# Patient Record
Sex: FEMALE | Race: WHITE | Employment: FULL TIME | URBAN - METROPOLITAN AREA
[De-identification: names, ages, dates, MRNs, and addresses within clinical notes are randomized per-mention and may not be internally consistent; named-entity substitution may affect disease eponyms.]

---

## 2017-08-16 ENCOUNTER — OFFICE VISIT (OUTPATIENT)
Dept: CARDIOLOGY CLINIC | Age: 57
End: 2017-08-16

## 2017-08-16 VITALS
SYSTOLIC BLOOD PRESSURE: 166 MMHG | OXYGEN SATURATION: 98 % | RESPIRATION RATE: 18 BRPM | HEART RATE: 76 BPM | DIASTOLIC BLOOD PRESSURE: 88 MMHG | HEIGHT: 66 IN | WEIGHT: 119 LBS | BODY MASS INDEX: 19.13 KG/M2

## 2017-08-16 DIAGNOSIS — F17.200 TOBACCO USE DISORDER: ICD-10-CM

## 2017-08-16 DIAGNOSIS — I10 ESSENTIAL HYPERTENSION, BENIGN: Primary | ICD-10-CM

## 2017-08-16 NOTE — PROGRESS NOTES
[address] [facility]        [phone]                             NEW PATIENT HPI/FOLLOW-UP    NAME:  Sadi Cortez   :   1960   MRN:   [id number]   PCP:  Cortez Shah MD           Subjective: The patient is a 56y.o. year old female who returns for a routine follow-up. Since the last visit, patient reports no new symptoms. Under stress and duress at work. Brings BP diary which reveals normal values < 130/85 mmHg. Denies change in exercise tolerance, chest pain, edema, medication intolerance, palpitations, shortness of breath, PND/orthopnea wheezing, sputum, syncope, dizziness or light headedness. Doing satisfactorily. Review of Systems  General: Pt denies excessive weight gain or loss. Pt is able to conduct ADL's. Respiratory: Denies shortness of breath, BENITEZ, wheezing or stridor. Cardiovascular: Denies precordial pain, palpitations, edema or PND  Gastrointestinal: Denies poor appetite, indigestion, abdominal pain or blood in stool  Peripheral vascular: Denies claudication, leg cramps  Neurological: Denies paresthesias, tingling.numbness  Psychiatric: Denies anxiety,depression,fatigue  Musculoskeletal: Denies pain,tenderness, soreness,swelling      Past Medical History:   Diagnosis Date    Dizziness and giddiness     GERD (gastroesophageal reflux disease)     Tobacco use disorder      Patient Active Problem List    Diagnosis Date Noted    Tobacco use disorder 04/10/2012    Essential hypertension, benign 2012    Dizziness and giddiness 2012      No past surgical history on file. No Known Allergies   No family history on file. Social History     Social History    Marital status:      Spouse name: N/A    Number of children: N/A    Years of education: N/A     Occupational History    Not on file.      Social History Main Topics    Smoking status: Current Every Day Smoker     Packs/day: 1.50     Years: 20.00     Types: Cigarettes    Smokeless tobacco: Never Used    Alcohol use No    Drug use: Not on file    Sexual activity: Not on file     Other Topics Concern    Not on file     Social History Narrative      Current Outpatient Prescriptions   Medication Sig    carvedilol (COREG) 3.125 mg tablet Take one tablet or 3.125 mg by mouth twice a day with meals    losartan-hydroCHLOROthiazide (HYZAAR) 100-12.5 mg per tablet take 1 tablet by mouth once daily    UBIDECARENONE (COENZYME Q10) 100 mg Tab Take 200 mg by mouth.  ibuprofen (ADVIL) 200 mg tablet Take  by mouth every six (6) hours as needed.  MULTIVIT WITH CALCIUM,IRON,MIN (ONE-A-DAY WOMENS FORMULA PO) Take  by mouth. No current facility-administered medications for this visit. I have reviewed the nurses notes, vitals, problem list, allergy list, medical history, family medical, social history and medications. Objective:     Physical Exam:     Vitals:    08/16/17 1451   BP: 166/88   Pulse: 76   Resp: 18   SpO2: 98%   Weight: 119 lb (54 kg)   Height: 5' 6\" (1.676 m)    Body mass index is 19.21 kg/(m^2). General: Well developed, in no acute distress. HEENT: No carotid bruits, no JVD, trach is midline. Heart:  Normal S1/S2 negative S3 or S4. Regular, no murmur, gallop or rub.   Respiratory: Clear bilaterally, no wheezing or rales  Abdomen:   Soft, non-tender, bowel sounds are active.   Extremities:  No edema, normal cap refill, no cyanosis. Neuro: A&Ox3, speech clear, gait stable. Skin: Skin color is normal. No rashes or lesions. No diaphoresis. Vascular: 2+ pulses symmetric in all extremities        Data Review:       Cardiographics:    EKG: Probable sinus rhythm, poor anteroseptal R wave progression. Cardiology Labs:    No results found for this or any previous visit.     Lab Results   Component Value Date/Time    Cholesterol, total 155 07/24/2013 12:00 AM    HDL Cholesterol 60 07/24/2013 12:00 AM    LDL, calculated 75 07/24/2013 12:00 AM    Triglyceride 98 07/24/2013 12:00 AM       Lab Results   Component Value Date/Time    Sodium 139 07/24/2013 12:00 AM    Potassium 4.1 07/24/2013 12:00 AM    Chloride 103 07/24/2013 12:00 AM    CO2 23 07/24/2013 12:00 AM    Glucose 90 07/24/2013 12:00 AM    BUN 14 07/24/2013 12:00 AM    Creatinine 0.73 07/24/2013 12:00 AM    BUN/Creatinine ratio 19 07/24/2013 12:00 AM    GFR est non-AA 96 07/24/2013 12:00 AM    Calcium 9.2 07/24/2013 12:00 AM    Bilirubin, total 0.5 07/24/2013 12:00 AM    AST (SGOT) 14 07/24/2013 12:00 AM    Alk. phosphatase 80 07/24/2013 12:00 AM    Protein, total 7.1 07/24/2013 12:00 AM    Albumin 4.2 07/24/2013 12:00 AM    A-G Ratio 1.4 07/24/2013 12:00 AM    ALT (SGPT) 6 07/24/2013 12:00 AM          Assessment:       ICD-10-CM ICD-9-CM    1. Essential hypertension, benign I10 401.1 AMB POC EKG ROUTINE W/ 12 LEADS, INTER & REP   2. Tobacco use disorder F17.200 305.1          Discussion: Patient presents at this time stable from a cardiac perspective. BP at goal. Pleased with present status. Plan: 1. Continue same meds. Lipid profile and labs followed by PCP. 2.Encouraged to exercise to tolerance and follow low fat, low cholesterol, low sodium predominantly Plant-based (consider Mediterranean) diet. Call with questions or concerns. Will follow up any test results by phone and/or f/u here in office if needed. Nathanael Carreon 3.Follow up: 1 YEAR    I have discussed the diagnosis with the patient and the intended plan as seen in the above orders. The patient has received an after-visit summary and questions were answered concerning future plans. I have discussed any concerning medication side effects and warnings with the patient as well.     Jackelyn Jaeger MD  8/16/2017

## 2017-08-21 DIAGNOSIS — I10 ESSENTIAL HYPERTENSION, BENIGN: ICD-10-CM

## 2017-08-21 RX ORDER — CARVEDILOL 3.12 MG/1
TABLET ORAL
Qty: 180 TAB | Refills: 3 | Status: SHIPPED | OUTPATIENT
Start: 2017-08-21 | End: 2018-01-01 | Stop reason: SDUPTHER

## 2017-08-21 RX ORDER — LOSARTAN POTASSIUM AND HYDROCHLOROTHIAZIDE 12.5; 1 MG/1; MG/1
TABLET ORAL
Qty: 90 TAB | Refills: 3 | Status: SHIPPED | OUTPATIENT
Start: 2017-08-21 | End: 2018-01-01 | Stop reason: SDUPTHER

## 2018-01-01 ENCOUNTER — OFFICE VISIT (OUTPATIENT)
Dept: CARDIOLOGY CLINIC | Age: 58
End: 2018-01-01

## 2018-01-01 VITALS
HEIGHT: 66 IN | HEART RATE: 80 BPM | DIASTOLIC BLOOD PRESSURE: 84 MMHG | RESPIRATION RATE: 16 BRPM | WEIGHT: 115.9 LBS | SYSTOLIC BLOOD PRESSURE: 158 MMHG | OXYGEN SATURATION: 98 % | BODY MASS INDEX: 18.63 KG/M2

## 2018-01-01 DIAGNOSIS — I10 ESSENTIAL HYPERTENSION, BENIGN: Primary | ICD-10-CM

## 2018-01-01 DIAGNOSIS — F17.200 TOBACCO USE DISORDER: ICD-10-CM

## 2018-01-01 DIAGNOSIS — F41.1 GENERALIZED ANXIETY DISORDER: ICD-10-CM

## 2018-01-01 DIAGNOSIS — I10 ESSENTIAL HYPERTENSION, BENIGN: ICD-10-CM

## 2018-01-01 RX ORDER — LOSARTAN POTASSIUM AND HYDROCHLOROTHIAZIDE 12.5; 1 MG/1; MG/1
TABLET ORAL
Qty: 90 TAB | Refills: 3 | Status: SHIPPED | OUTPATIENT
Start: 2018-01-01

## 2018-01-01 RX ORDER — CARVEDILOL 3.12 MG/1
TABLET ORAL
Qty: 180 TAB | Refills: 3 | Status: SHIPPED | OUTPATIENT
Start: 2018-01-01

## 2018-08-09 PROBLEM — F41.1 GENERALIZED ANXIETY DISORDER: Status: ACTIVE | Noted: 2018-01-01

## 2018-08-09 NOTE — PROGRESS NOTES
[address] [place] [address]        [phone]                             NEW PATIENT HPI/FOLLOW-UP    NAME:  Oscar Garrett   :   1960   MRN:   [id number]   PCP:  Apolinar Garcia MD           Subjective: The patient is a 66y.o. year old female  who returns for a routine follow-up. Since the last visit, patient reports no new symptoms. C/O family and job stress. Denies change in exercise tolerance, chest pain, edema, medication intolerance, palpitations, shortness of breath, PND/orthopnea wheezing, sputum, syncope, dizziness or light headedness. Doing satisfactorily. Review of Systems  General: Pt denies excessive weight gain or loss. Pt is able to conduct ADL's. Respiratory: Denies shortness of breath, BENITEZ, wheezing or stridor. Cardiovascular: Denies precordial pain, palpitations, edema or PND  Gastrointestinal: Denies poor appetite, indigestion, abdominal pain or blood in stool  Peripheral vascular: Denies claudication, leg cramps  Neurological: Denies paresthesias, tingling.numbness  Psychiatric: +anxiety,depression,-fatigue  Musculoskeletal: Denies pain,tenderness, soreness,swelling      Past Medical History:   Diagnosis Date    Dizziness and giddiness     GERD (gastroesophageal reflux disease)     Tobacco use disorder      Patient Active Problem List    Diagnosis Date Noted    Tobacco use disorder 04/10/2012    Essential hypertension, benign 2012    Dizziness and giddiness 2012      No past surgical history on file. No Known Allergies   No family history on file. Social History     Social History    Marital status:      Spouse name: N/A    Number of children: N/A    Years of education: N/A     Occupational History    Not on file.      Social History Main Topics    Smoking status: Current Some Day Smoker     Packs/day: 1.50     Years: 20.00     Types: Cigarettes    Smokeless tobacco: Never Used      Comment: 2-3 CIAGARETTES DAILY OR SOMETIMES    Alcohol use No    Drug use: No    Sexual activity: Not on file     Other Topics Concern    Not on file     Social History Narrative      Current Outpatient Prescriptions   Medication Sig    carvedilol (COREG) 3.125 mg tablet Take one tablet or 3.125 mg by mouth twice a day with meals    losartan-hydroCHLOROthiazide (HYZAAR) 100-12.5 mg per tablet take 1 tablet by mouth once daily    UBIDECARENONE (COENZYME Q10) 100 mg Tab Take 200 mg by mouth.  ibuprofen (ADVIL) 200 mg tablet Take  by mouth every six (6) hours as needed.  MULTIVIT WITH CALCIUM,IRON,MIN (ONE-A-DAY WOMENS FORMULA PO) Take  by mouth. No current facility-administered medications for this visit. I have reviewed the nurses notes, vitals, problem list, allergy list, medical history, family medical, social history and medications. Objective:     Physical Exam:     Vitals:    08/08/18 1607   BP: 158/84   Pulse: 80   Resp: 16   SpO2: 98%   Weight: 115 lb 14.4 oz (52.6 kg)   Height: 5' 6\" (1.676 m)    Body mass index is 18.71 kg/(m^2). General: Well developed, thin, in no acute distress. HEENT: No carotid bruits, no JVD, trach is midline. Heart:  Normal S1/S2 negative S3 or S4. Regular, no murmur, gallop or rub.   Respiratory: Clear bilaterally, no wheezing or rales  Abdomen:   Soft, non-tender, bowel sounds are active.   Extremities:  No edema, normal cap refill, no cyanosis. Neuro: A&Ox3, speech clear, gait stable. Skin: Skin color is normal. No rashes or lesions. No diaphoresis. Vascular: 2+ pulses symmetric in all extremities        Data Review:       Cardiographics:    EKG: Probably sinus though ectopic atrial rhythm not excluded    Cardiology Labs:    No results found for this or any previous visit.     Lab Results   Component Value Date/Time    Cholesterol, total 155 07/24/2013 12:00 AM    HDL Cholesterol 60 07/24/2013 12:00 AM    LDL, calculated 75 07/24/2013 12:00 AM    Triglyceride 98 07/24/2013 12:00 AM       Lab Results   Component Value Date/Time    Sodium 139 07/24/2013 12:00 AM    Potassium 4.1 07/24/2013 12:00 AM    Chloride 103 07/24/2013 12:00 AM    CO2 23 07/24/2013 12:00 AM    Glucose 90 07/24/2013 12:00 AM    BUN 14 07/24/2013 12:00 AM    Creatinine 0.73 07/24/2013 12:00 AM    BUN/Creatinine ratio 19 07/24/2013 12:00 AM    GFR est non-AA 96 07/24/2013 12:00 AM    Calcium 9.2 07/24/2013 12:00 AM    Bilirubin, total 0.5 07/24/2013 12:00 AM    AST (SGOT) 14 07/24/2013 12:00 AM    Alk. phosphatase 80 07/24/2013 12:00 AM    Protein, total 7.1 07/24/2013 12:00 AM    Albumin 4.2 07/24/2013 12:00 AM    A-G Ratio 1.4 07/24/2013 12:00 AM    ALT (SGPT) 6 07/24/2013 12:00 AM          Assessment:       ICD-10-CM ICD-9-CM    1. Essential hypertension, benign I10 401.1 AMB POC EKG ROUTINE W/ 12 LEADS, INTER & REP   2. Tobacco use disorder F17.200 305.1    3. Generalized anxiety disorder F41.1 300.02          Discussion: Patient presents at this time stable from a cardiac perspective. BP at goal. Continue meds. C/O anxiety family and job generated. Discussing with PCP. Pleased with present cardiac status. Plan: 1. Continue same meds. Lipid profile and labs followed by PCP--at goal in past.    2.Encouraged to exercise to tolerance, stop smoking and follow low fat, low cholesterol, low sodium predominantly Plant-based (consider Mediterranean) diet. Call with questions or concerns. Will follow up any test results by phone and/or f/u here in office if needed. Nile Kidd 3.Follow up: 6 months    I have discussed the diagnosis with the patient and the intended plan as seen in the above orders. The patient has received an after-visit summary and questions were answered concerning future plans. I have discussed any concerning medication side effects and warnings with the patient as well.     Blair Spencer MD  8/9/2018

## 2018-08-20 NOTE — TELEPHONE ENCOUNTER
Patient is in need of refills, didn't have meds on her so she didn't know the exact ones. She said pharmacy faxed over refill request. Thanks

## 2019-01-01 ENCOUNTER — HOSPITAL ENCOUNTER (OUTPATIENT)
Dept: CT IMAGING | Age: 59
Discharge: HOME | End: 2019-01-08
Attending: PHYSICIAN ASSISTANT
Payer: COMMERCIAL

## 2019-01-01 ENCOUNTER — HOSPICE ADMISSION (OUTPATIENT)
Dept: HOSPICE | Facility: HOSPICE | Age: 59
End: 2019-01-01
Payer: COMMERCIAL

## 2019-01-01 ENCOUNTER — HOME CARE VISIT (OUTPATIENT)
Dept: SCHEDULING | Facility: HOME HEALTH | Age: 59
End: 2019-01-01
Payer: COMMERCIAL

## 2019-01-01 ENCOUNTER — APPOINTMENT (OUTPATIENT)
Dept: GENERAL RADIOLOGY | Age: 59
DRG: 189 | End: 2019-01-01
Attending: EMERGENCY MEDICINE
Payer: COMMERCIAL

## 2019-01-01 ENCOUNTER — APPOINTMENT (OUTPATIENT)
Dept: ULTRASOUND IMAGING | Age: 59
DRG: 189 | End: 2019-01-01
Attending: INTERNAL MEDICINE
Payer: COMMERCIAL

## 2019-01-01 ENCOUNTER — HOSPITAL ENCOUNTER (INPATIENT)
Age: 59
LOS: 1 days | DRG: 189 | End: 2019-01-20
Attending: INTERNAL MEDICINE | Admitting: INTERNAL MEDICINE
Payer: OTHER MISCELLANEOUS

## 2019-01-01 ENCOUNTER — APPOINTMENT (OUTPATIENT)
Dept: CT IMAGING | Age: 59
DRG: 189 | End: 2019-01-01
Attending: EMERGENCY MEDICINE
Payer: COMMERCIAL

## 2019-01-01 ENCOUNTER — HOSPITAL ENCOUNTER (INPATIENT)
Age: 59
LOS: 4 days | Discharge: HOSPICE | DRG: 189 | End: 2019-01-19
Attending: EMERGENCY MEDICINE | Admitting: INTERNAL MEDICINE
Payer: COMMERCIAL

## 2019-01-01 VITALS
TEMPERATURE: 97.1 F | BODY MASS INDEX: 16.55 KG/M2 | SYSTOLIC BLOOD PRESSURE: 87 MMHG | OXYGEN SATURATION: 100 % | DIASTOLIC BLOOD PRESSURE: 63 MMHG | HEIGHT: 66 IN | WEIGHT: 103 LBS | RESPIRATION RATE: 15 BRPM | HEART RATE: 103 BPM

## 2019-01-01 VITALS
HEART RATE: 111 BPM | TEMPERATURE: 99.7 F | OXYGEN SATURATION: 97 % | DIASTOLIC BLOOD PRESSURE: 53 MMHG | SYSTOLIC BLOOD PRESSURE: 92 MMHG | RESPIRATION RATE: 20 BRPM

## 2019-01-01 DIAGNOSIS — Z51.5 ENCOUNTER FOR DYING CARE: ICD-10-CM

## 2019-01-01 DIAGNOSIS — Z63.8 CAREGIVER ROLE STRAIN: ICD-10-CM

## 2019-01-01 DIAGNOSIS — Z71.89 GOALS OF CARE, COUNSELING/DISCUSSION: ICD-10-CM

## 2019-01-01 DIAGNOSIS — R06.03 ACUTE RESPIRATORY DISTRESS: ICD-10-CM

## 2019-01-01 DIAGNOSIS — Z71.89 COUNSELING REGARDING ADVANCED CARE PLANNING AND GOALS OF CARE: ICD-10-CM

## 2019-01-01 DIAGNOSIS — R00.0 SINUS TACHYCARDIA: ICD-10-CM

## 2019-01-01 DIAGNOSIS — J44.9 COPD, SEVERE (HCC): Primary | ICD-10-CM

## 2019-01-01 DIAGNOSIS — R77.8 ELEVATED TROPONIN: ICD-10-CM

## 2019-01-01 DIAGNOSIS — R64 CACHECTIC (HCC): ICD-10-CM

## 2019-01-01 DIAGNOSIS — F41.9 ANXIETY: ICD-10-CM

## 2019-01-01 DIAGNOSIS — R59.0 MEDIASTINAL LYMPHADENOPATHY: ICD-10-CM

## 2019-01-01 DIAGNOSIS — R05.9 COUGH: ICD-10-CM

## 2019-01-01 DIAGNOSIS — R59.9 ENLARGED LYMPH NODES: ICD-10-CM

## 2019-01-01 DIAGNOSIS — R06.02 SHORTNESS OF BREATH: ICD-10-CM

## 2019-01-01 LAB
ALBUMIN SERPL-MCNC: 2.9 G/DL (ref 3.5–5)
ALBUMIN SERPL-MCNC: 2.9 G/DL (ref 3.5–5)
ALBUMIN/GLOB SERPL: 0.6 {RATIO} (ref 1.1–2.2)
ALBUMIN/GLOB SERPL: 0.6 {RATIO} (ref 1.1–2.2)
ALP SERPL-CCNC: 151 U/L (ref 45–117)
ALP SERPL-CCNC: 159 U/L (ref 45–117)
ALT SERPL-CCNC: 57 U/L (ref 12–78)
ALT SERPL-CCNC: 58 U/L (ref 12–78)
ANION GAP SERPL CALC-SCNC: 10 MMOL/L (ref 5–15)
ANION GAP SERPL CALC-SCNC: 10 MMOL/L (ref 5–15)
ANION GAP SERPL CALC-SCNC: 13 MMOL/L (ref 5–15)
APTT PPP: 26.1 SEC (ref 22.1–32)
APTT PPP: 34.4 SEC (ref 22.1–32)
APTT PPP: 40.6 SEC (ref 22.1–32)
APTT PPP: 47.6 SEC (ref 22.1–32)
APTT PPP: 50.6 SEC (ref 22.1–32)
APTT PPP: 68.6 SEC (ref 22.1–32)
APTT PPP: 76.8 SEC (ref 22.1–32)
AST SERPL-CCNC: 107 U/L (ref 15–37)
AST SERPL-CCNC: 130 U/L (ref 15–37)
ATRIAL RATE: 118 BPM
ATRIAL RATE: 135 BPM
BASOPHILS # BLD: 0 K/UL (ref 0–0.1)
BASOPHILS NFR BLD: 0 % (ref 0–1)
BILIRUB DIRECT SERPL-MCNC: <0.1 MG/DL (ref 0–0.2)
BILIRUB SERPL-MCNC: 0.2 MG/DL (ref 0.2–1)
BILIRUB SERPL-MCNC: 0.3 MG/DL (ref 0.2–1)
BNP SERPL-MCNC: 797 PG/ML (ref 0–125)
BUN SERPL-MCNC: 28 MG/DL (ref 6–20)
BUN SERPL-MCNC: 29 MG/DL (ref 6–20)
BUN SERPL-MCNC: 42 MG/DL (ref 6–20)
BUN/CREAT SERPL: 32 (ref 12–20)
BUN/CREAT SERPL: 32 (ref 12–20)
BUN/CREAT SERPL: 44 (ref 12–20)
CALCIUM SERPL-MCNC: 8.4 MG/DL (ref 8.5–10.1)
CALCIUM SERPL-MCNC: 8.7 MG/DL (ref 8.5–10.1)
CALCIUM SERPL-MCNC: 8.7 MG/DL (ref 8.5–10.1)
CALCULATED P AXIS, ECG09: 89 DEGREES
CALCULATED P AXIS, ECG09: 90 DEGREES
CALCULATED R AXIS, ECG10: -43 DEGREES
CALCULATED R AXIS, ECG10: 18 DEGREES
CALCULATED T AXIS, ECG11: 100 DEGREES
CALCULATED T AXIS, ECG11: 90 DEGREES
CHLORIDE SERPL-SCNC: 104 MMOL/L (ref 97–108)
CHLORIDE SERPL-SCNC: 107 MMOL/L (ref 97–108)
CHLORIDE SERPL-SCNC: 108 MMOL/L (ref 97–108)
CHOLEST SERPL-MCNC: 132 MG/DL
CK SERPL-CCNC: 133 U/L (ref 26–192)
CO2 SERPL-SCNC: 18 MMOL/L (ref 21–32)
CO2 SERPL-SCNC: 22 MMOL/L (ref 21–32)
CO2 SERPL-SCNC: 22 MMOL/L (ref 21–32)
CREAT SERPL-MCNC: 0.88 MG/DL (ref 0.55–1.02)
CREAT SERPL-MCNC: 0.9 MG/DL (ref 0.55–1.02)
CREAT SERPL-MCNC: 0.95 MG/DL (ref 0.55–1.02)
DIAGNOSIS, 93000: NORMAL
DIAGNOSIS, 93000: NORMAL
DIFFERENTIAL METHOD BLD: ABNORMAL
EOSINOPHIL # BLD: 0 K/UL (ref 0–0.4)
EOSINOPHIL NFR BLD: 0 % (ref 0–7)
ERYTHROCYTE [DISTWIDTH] IN BLOOD BY AUTOMATED COUNT: 13.4 % (ref 11.5–14.5)
ERYTHROCYTE [DISTWIDTH] IN BLOOD BY AUTOMATED COUNT: 13.5 % (ref 11.5–14.5)
ERYTHROCYTE [DISTWIDTH] IN BLOOD BY AUTOMATED COUNT: 13.6 % (ref 11.5–14.5)
ERYTHROCYTE [DISTWIDTH] IN BLOOD BY AUTOMATED COUNT: 13.6 % (ref 11.5–14.5)
ERYTHROCYTE [DISTWIDTH] IN BLOOD BY AUTOMATED COUNT: 13.8 % (ref 11.5–14.5)
EST. AVERAGE GLUCOSE BLD GHB EST-MCNC: 123 MG/DL
GLOBULIN SER CALC-MCNC: 4.5 G/DL (ref 2–4)
GLOBULIN SER CALC-MCNC: 4.7 G/DL (ref 2–4)
GLUCOSE SERPL-MCNC: 135 MG/DL (ref 65–100)
GLUCOSE SERPL-MCNC: 144 MG/DL (ref 65–100)
GLUCOSE SERPL-MCNC: 168 MG/DL (ref 65–100)
HBA1C MFR BLD: 5.9 % (ref 4.2–6.3)
HCT VFR BLD AUTO: 33.3 % (ref 35–47)
HCT VFR BLD AUTO: 33.8 % (ref 35–47)
HCT VFR BLD AUTO: 34.4 % (ref 35–47)
HCT VFR BLD AUTO: 34.8 % (ref 35–47)
HCT VFR BLD AUTO: 34.8 % (ref 35–47)
HDLC SERPL-MCNC: 63 MG/DL
HDLC SERPL: 2.1 {RATIO} (ref 0–5)
HGB BLD-MCNC: 10.9 G/DL (ref 11.5–16)
HGB BLD-MCNC: 11.2 G/DL (ref 11.5–16)
HGB BLD-MCNC: 11.2 G/DL (ref 11.5–16)
HGB BLD-MCNC: 11.4 G/DL (ref 11.5–16)
HGB BLD-MCNC: 11.5 G/DL (ref 11.5–16)
IMM GRANULOCYTES # BLD AUTO: 0.1 K/UL (ref 0–0.04)
IMM GRANULOCYTES # BLD AUTO: 0.2 K/UL (ref 0–0.04)
IMM GRANULOCYTES NFR BLD AUTO: 1 % (ref 0–0.5)
INR PPP: 1.1 (ref 0.9–1.1)
LDLC SERPL CALC-MCNC: 47.8 MG/DL (ref 0–100)
LIPID PROFILE,FLP: NORMAL
LYMPHOCYTES # BLD: 0.5 K/UL (ref 0.8–3.5)
LYMPHOCYTES # BLD: 0.7 K/UL (ref 0.8–3.5)
LYMPHOCYTES # BLD: 0.8 K/UL (ref 0.8–3.5)
LYMPHOCYTES # BLD: 1 K/UL (ref 0.8–3.5)
LYMPHOCYTES # BLD: 1.1 K/UL (ref 0.8–3.5)
LYMPHOCYTES NFR BLD: 3 % (ref 12–49)
LYMPHOCYTES NFR BLD: 4 % (ref 12–49)
LYMPHOCYTES NFR BLD: 5 % (ref 12–49)
LYMPHOCYTES NFR BLD: 6 % (ref 12–49)
LYMPHOCYTES NFR BLD: 7 % (ref 12–49)
MAGNESIUM SERPL-MCNC: 2.9 MG/DL (ref 1.6–2.4)
MCH RBC QN AUTO: 28.8 PG (ref 26–34)
MCH RBC QN AUTO: 29.2 PG (ref 26–34)
MCH RBC QN AUTO: 29.3 PG (ref 26–34)
MCH RBC QN AUTO: 29.6 PG (ref 26–34)
MCH RBC QN AUTO: 29.6 PG (ref 26–34)
MCHC RBC AUTO-ENTMCNC: 32.6 G/DL (ref 30–36.5)
MCHC RBC AUTO-ENTMCNC: 32.7 G/DL (ref 30–36.5)
MCHC RBC AUTO-ENTMCNC: 32.8 G/DL (ref 30–36.5)
MCHC RBC AUTO-ENTMCNC: 33 G/DL (ref 30–36.5)
MCHC RBC AUTO-ENTMCNC: 33.1 G/DL (ref 30–36.5)
MCV RBC AUTO: 87.9 FL (ref 80–99)
MCV RBC AUTO: 89.2 FL (ref 80–99)
MCV RBC AUTO: 89.5 FL (ref 80–99)
MCV RBC AUTO: 89.5 FL (ref 80–99)
MCV RBC AUTO: 89.6 FL (ref 80–99)
MONOCYTES # BLD: 0.2 K/UL (ref 0–1)
MONOCYTES # BLD: 0.5 K/UL (ref 0–1)
MONOCYTES # BLD: 0.5 K/UL (ref 0–1)
MONOCYTES # BLD: 1 K/UL (ref 0–1)
MONOCYTES # BLD: 1.3 K/UL (ref 0–1)
MONOCYTES NFR BLD: 1 % (ref 5–13)
MONOCYTES NFR BLD: 4 % (ref 5–13)
MONOCYTES NFR BLD: 4 % (ref 5–13)
MONOCYTES NFR BLD: 6 % (ref 5–13)
MONOCYTES NFR BLD: 6 % (ref 5–13)
NEUTS SEG # BLD: 10.9 K/UL (ref 1.8–8)
NEUTS SEG # BLD: 12.2 K/UL (ref 1.8–8)
NEUTS SEG # BLD: 14.1 K/UL (ref 1.8–8)
NEUTS SEG # BLD: 15.7 K/UL (ref 1.8–8)
NEUTS SEG # BLD: 20.5 K/UL (ref 1.8–8)
NEUTS SEG NFR BLD: 87 % (ref 32–75)
NEUTS SEG NFR BLD: 90 % (ref 32–75)
NEUTS SEG NFR BLD: 95 % (ref 32–75)
NRBC # BLD: 0 K/UL (ref 0–0.01)
NRBC BLD-RTO: 0 PER 100 WBC
P-R INTERVAL, ECG05: 134 MS
P-R INTERVAL, ECG05: 138 MS
PLATELET # BLD AUTO: 309 K/UL (ref 150–400)
PLATELET # BLD AUTO: 357 K/UL (ref 150–400)
PLATELET # BLD AUTO: 411 K/UL (ref 150–400)
PLATELET # BLD AUTO: 450 K/UL (ref 150–400)
PLATELET # BLD AUTO: 499 K/UL (ref 150–400)
PMV BLD AUTO: 10.2 FL (ref 8.9–12.9)
PMV BLD AUTO: 10.2 FL (ref 8.9–12.9)
PMV BLD AUTO: 10.3 FL (ref 8.9–12.9)
PMV BLD AUTO: 10.4 FL (ref 8.9–12.9)
PMV BLD AUTO: 9.9 FL (ref 8.9–12.9)
POTASSIUM SERPL-SCNC: 3.5 MMOL/L (ref 3.5–5.1)
POTASSIUM SERPL-SCNC: 3.9 MMOL/L (ref 3.5–5.1)
POTASSIUM SERPL-SCNC: 4 MMOL/L (ref 3.5–5.1)
PROT SERPL-MCNC: 7.4 G/DL (ref 6.4–8.2)
PROT SERPL-MCNC: 7.6 G/DL (ref 6.4–8.2)
PROTHROMBIN TIME: 11.6 SEC (ref 9–11.1)
Q-T INTERVAL, ECG07: 280 MS
Q-T INTERVAL, ECG07: 346 MS
QRS DURATION, ECG06: 72 MS
QRS DURATION, ECG06: 72 MS
QTC CALCULATION (BEZET), ECG08: 420 MS
QTC CALCULATION (BEZET), ECG08: 484 MS
RBC # BLD AUTO: 3.72 M/UL (ref 3.8–5.2)
RBC # BLD AUTO: 3.79 M/UL (ref 3.8–5.2)
RBC # BLD AUTO: 3.84 M/UL (ref 3.8–5.2)
RBC # BLD AUTO: 3.89 M/UL (ref 3.8–5.2)
RBC # BLD AUTO: 3.96 M/UL (ref 3.8–5.2)
RBC MORPH BLD: ABNORMAL
RBC MORPH BLD: ABNORMAL
SODIUM SERPL-SCNC: 136 MMOL/L (ref 136–145)
SODIUM SERPL-SCNC: 138 MMOL/L (ref 136–145)
SODIUM SERPL-SCNC: 140 MMOL/L (ref 136–145)
T4 FREE SERPL-MCNC: 1 NG/DL (ref 0.8–1.5)
THERAPEUTIC RANGE,PTTT: ABNORMAL SECS (ref 58–77)
THERAPEUTIC RANGE,PTTT: NORMAL SECS (ref 58–77)
TRIGL SERPL-MCNC: 106 MG/DL (ref ?–150)
TROPONIN I SERPL-MCNC: 1.76 NG/ML
TROPONIN I SERPL-MCNC: 5.19 NG/ML
TROPONIN I SERPL-MCNC: 6.49 NG/ML
TSH SERPL DL<=0.05 MIU/L-ACNC: 2.01 UIU/ML (ref 0.36–3.74)
VENTRICULAR RATE, ECG03: 118 BPM
VENTRICULAR RATE, ECG03: 135 BPM
VLDLC SERPL CALC-MCNC: 21.2 MG/DL
WBC # BLD AUTO: 12.2 K/UL (ref 3.6–11)
WBC # BLD AUTO: 13.5 K/UL (ref 3.6–11)
WBC # BLD AUTO: 16.3 K/UL (ref 3.6–11)
WBC # BLD AUTO: 16.6 K/UL (ref 3.6–11)
WBC # BLD AUTO: 23 K/UL (ref 3.6–11)

## 2019-01-01 PROCEDURE — 94760 N-INVAS EAR/PLS OXIMETRY 1: CPT

## 2019-01-01 PROCEDURE — 94640 AIRWAY INHALATION TREATMENT: CPT

## 2019-01-01 PROCEDURE — 65660000000 HC RM CCU STEPDOWN

## 2019-01-01 PROCEDURE — 74011250636 HC RX REV CODE- 250/636: Performed by: INTERNAL MEDICINE

## 2019-01-01 PROCEDURE — 84443 ASSAY THYROID STIM HORMONE: CPT

## 2019-01-01 PROCEDURE — 74011250637 HC RX REV CODE- 250/637: Performed by: INTERNAL MEDICINE

## 2019-01-01 PROCEDURE — 65270000029 HC RM PRIVATE

## 2019-01-01 PROCEDURE — 77010033678 HC OXYGEN DAILY

## 2019-01-01 PROCEDURE — 83036 HEMOGLOBIN GLYCOSYLATED A1C: CPT

## 2019-01-01 PROCEDURE — 80076 HEPATIC FUNCTION PANEL: CPT

## 2019-01-01 PROCEDURE — 0656 HSPC GENERAL INPATIENT

## 2019-01-01 PROCEDURE — G0299 HHS/HOSPICE OF RN EA 15 MIN: HCPCS

## 2019-01-01 PROCEDURE — 77010033711 HC HIGH FLOW OXYGEN

## 2019-01-01 PROCEDURE — 82550 ASSAY OF CK (CPK): CPT

## 2019-01-01 PROCEDURE — 74011000250 HC RX REV CODE- 250: Performed by: NURSE PRACTITIONER

## 2019-01-01 PROCEDURE — 80048 BASIC METABOLIC PNL TOTAL CA: CPT

## 2019-01-01 PROCEDURE — 77030029684 HC NEB SM VOL KT MONA -A

## 2019-01-01 PROCEDURE — 36415 COLL VENOUS BLD VENIPUNCTURE: CPT

## 2019-01-01 PROCEDURE — 74011636320 HC RX REV CODE- 636/320: Performed by: EMERGENCY MEDICINE

## 2019-01-01 PROCEDURE — 85025 COMPLETE CBC W/AUTO DIFF WBC: CPT

## 2019-01-01 PROCEDURE — 51798 US URINE CAPACITY MEASURE: CPT

## 2019-01-01 PROCEDURE — 84484 ASSAY OF TROPONIN QUANT: CPT

## 2019-01-01 PROCEDURE — 74011000250 HC RX REV CODE- 250: Performed by: INTERNAL MEDICINE

## 2019-01-01 PROCEDURE — 74011250636 HC RX REV CODE- 250/636: Performed by: EMERGENCY MEDICINE

## 2019-01-01 PROCEDURE — 77030034849

## 2019-01-01 PROCEDURE — 85730 THROMBOPLASTIN TIME PARTIAL: CPT

## 2019-01-01 PROCEDURE — 74011250636 HC RX REV CODE- 250/636: Performed by: NURSE PRACTITIONER

## 2019-01-01 PROCEDURE — 84439 ASSAY OF FREE THYROXINE: CPT

## 2019-01-01 PROCEDURE — 94761 N-INVAS EAR/PLS OXIMETRY MLT: CPT

## 2019-01-01 PROCEDURE — 65270000015 HC RM PRIVATE ONCOLOGY

## 2019-01-01 PROCEDURE — 74011000250 HC RX REV CODE- 250

## 2019-01-01 PROCEDURE — 3336500001 HSPC ELECTION

## 2019-01-01 PROCEDURE — 76450000000

## 2019-01-01 PROCEDURE — 77030012794 HC KT NSL CANN/HGH TRAN -A

## 2019-01-01 PROCEDURE — 74011000258 HC RX REV CODE- 258: Performed by: RADIOLOGY

## 2019-01-01 PROCEDURE — 80053 COMPREHEN METABOLIC PANEL: CPT

## 2019-01-01 PROCEDURE — 93005 ELECTROCARDIOGRAM TRACING: CPT

## 2019-01-01 PROCEDURE — 71275 CT ANGIOGRAPHY CHEST: CPT

## 2019-01-01 PROCEDURE — 77030010545

## 2019-01-01 PROCEDURE — 71260 CT THORAX DX C+: CPT

## 2019-01-01 PROCEDURE — 99285 EMERGENCY DEPT VISIT HI MDM: CPT

## 2019-01-01 PROCEDURE — 74011636320 HC RX REV CODE- 636/320: Performed by: RADIOLOGY

## 2019-01-01 PROCEDURE — 80061 LIPID PANEL: CPT

## 2019-01-01 PROCEDURE — 71045 X-RAY EXAM CHEST 1 VIEW: CPT

## 2019-01-01 PROCEDURE — 74011250637 HC RX REV CODE- 250/637: Performed by: NURSE PRACTITIONER

## 2019-01-01 PROCEDURE — 92610 EVALUATE SWALLOWING FUNCTION: CPT | Performed by: SPEECH-LANGUAGE PATHOLOGIST

## 2019-01-01 PROCEDURE — 76705 ECHO EXAM OF ABDOMEN: CPT

## 2019-01-01 PROCEDURE — 83880 ASSAY OF NATRIURETIC PEPTIDE: CPT

## 2019-01-01 PROCEDURE — 93306 TTE W/DOPPLER COMPLETE: CPT

## 2019-01-01 PROCEDURE — 85610 PROTHROMBIN TIME: CPT

## 2019-01-01 PROCEDURE — 77030018846 HC SOL IRR STRL H20 ICUM -A

## 2019-01-01 PROCEDURE — 83735 ASSAY OF MAGNESIUM: CPT

## 2019-01-01 RX ORDER — GUAIFENESIN 100 MG/5ML
81 LIQUID (ML) ORAL DAILY
Status: DISCONTINUED | OUTPATIENT
Start: 2019-01-01 | End: 2019-01-01

## 2019-01-01 RX ORDER — LORAZEPAM 2 MG/ML
0.5 INJECTION INTRAMUSCULAR
Status: DISCONTINUED | OUTPATIENT
Start: 2019-01-01 | End: 2019-01-01

## 2019-01-01 RX ORDER — MORPHINE SULFATE 10 MG/ML
5 INJECTION, SOLUTION INTRAMUSCULAR; INTRAVENOUS
Status: DISCONTINUED | OUTPATIENT
Start: 2019-01-01 | End: 2019-01-01

## 2019-01-01 RX ORDER — LORAZEPAM 2 MG/ML
1 INJECTION INTRAMUSCULAR
Status: DISCONTINUED | OUTPATIENT
Start: 2019-01-01 | End: 2019-01-01

## 2019-01-01 RX ORDER — SODIUM CHLORIDE 9 MG/ML
50 INJECTION, SOLUTION INTRAVENOUS
Status: COMPLETED | OUTPATIENT
Start: 2019-01-01 | End: 2019-01-01

## 2019-01-01 RX ORDER — DEXTROSE MONOHYDRATE AND SODIUM CHLORIDE 5; .9 G/100ML; G/100ML
75 INJECTION, SOLUTION INTRAVENOUS CONTINUOUS
Status: DISCONTINUED | OUTPATIENT
Start: 2019-01-01 | End: 2019-01-01

## 2019-01-01 RX ORDER — LEVALBUTEROL INHALATION SOLUTION 1.25 MG/3ML
1.25 SOLUTION RESPIRATORY (INHALATION)
Status: DISCONTINUED | OUTPATIENT
Start: 2019-01-01 | End: 2019-01-01

## 2019-01-01 RX ORDER — MORPHINE SULFATE 10 MG/ML
5 INJECTION, SOLUTION INTRAMUSCULAR; INTRAVENOUS
Status: DISCONTINUED | OUTPATIENT
Start: 2019-01-01 | End: 2019-01-01 | Stop reason: HOSPADM

## 2019-01-01 RX ORDER — MAGNESIUM SULFATE 1 G/100ML
1 INJECTION INTRAVENOUS ONCE
Status: COMPLETED | OUTPATIENT
Start: 2019-01-01 | End: 2019-01-01

## 2019-01-01 RX ORDER — MORPHINE SULFATE 2 MG/ML
2 INJECTION, SOLUTION INTRAMUSCULAR; INTRAVENOUS
Status: DISCONTINUED | OUTPATIENT
Start: 2019-01-01 | End: 2019-01-01

## 2019-01-01 RX ORDER — LEVOFLOXACIN 5 MG/ML
750 INJECTION, SOLUTION INTRAVENOUS EVERY 24 HOURS
Status: DISCONTINUED | OUTPATIENT
Start: 2019-01-01 | End: 2019-01-01

## 2019-01-01 RX ORDER — FLUTICASONE FUROATE AND VILANTEROL 100; 25 UG/1; UG/1
1 POWDER RESPIRATORY (INHALATION) DAILY
Status: DISCONTINUED | OUTPATIENT
Start: 2019-01-01 | End: 2019-01-01

## 2019-01-01 RX ORDER — CARVEDILOL 3.12 MG/1
3.12 TABLET ORAL 2 TIMES DAILY WITH MEALS
Status: DISCONTINUED | OUTPATIENT
Start: 2019-01-01 | End: 2019-01-01

## 2019-01-01 RX ORDER — IPRATROPIUM BROMIDE AND ALBUTEROL SULFATE 2.5; .5 MG/3ML; MG/3ML
3 SOLUTION RESPIRATORY (INHALATION)
Status: DISCONTINUED | OUTPATIENT
Start: 2019-01-01 | End: 2019-01-01

## 2019-01-01 RX ORDER — HEPARIN SODIUM 5000 [USP'U]/ML
30 INJECTION, SOLUTION INTRAVENOUS; SUBCUTANEOUS AS NEEDED
Status: DISCONTINUED | OUTPATIENT
Start: 2019-01-01 | End: 2019-01-01

## 2019-01-01 RX ORDER — HEPARIN SODIUM 5000 [USP'U]/ML
60 INJECTION, SOLUTION INTRAVENOUS; SUBCUTANEOUS ONCE
Status: COMPLETED | OUTPATIENT
Start: 2019-01-01 | End: 2019-01-01

## 2019-01-01 RX ORDER — FACIAL-BODY WIPES
10 EACH TOPICAL DAILY PRN
Status: DISCONTINUED | OUTPATIENT
Start: 2019-01-01 | End: 2019-01-21 | Stop reason: HOSPADM

## 2019-01-01 RX ORDER — SCOLOPAMINE TRANSDERMAL SYSTEM 1 MG/1
1 PATCH, EXTENDED RELEASE TRANSDERMAL
Status: DISCONTINUED | OUTPATIENT
Start: 2019-01-01 | End: 2019-01-01 | Stop reason: HOSPADM

## 2019-01-01 RX ORDER — GUAIFENESIN 100 MG/5ML
100 SOLUTION ORAL
Status: DISCONTINUED | OUTPATIENT
Start: 2019-01-01 | End: 2019-01-01 | Stop reason: HOSPADM

## 2019-01-01 RX ORDER — IPRATROPIUM BROMIDE AND ALBUTEROL SULFATE 2.5; .5 MG/3ML; MG/3ML
3 SOLUTION RESPIRATORY (INHALATION)
Status: COMPLETED | OUTPATIENT
Start: 2019-01-01 | End: 2019-01-01

## 2019-01-01 RX ORDER — MORPHINE SULFATE 2 MG/ML
2 INJECTION, SOLUTION INTRAMUSCULAR; INTRAVENOUS
Status: DISCONTINUED | OUTPATIENT
Start: 2019-01-01 | End: 2019-01-21 | Stop reason: HOSPADM

## 2019-01-01 RX ORDER — IPRATROPIUM BROMIDE 0.5 MG/2.5ML
0.5 SOLUTION RESPIRATORY (INHALATION)
Status: DISCONTINUED | OUTPATIENT
Start: 2019-01-01 | End: 2019-01-01

## 2019-01-01 RX ORDER — HEPARIN SODIUM 5000 [USP'U]/ML
60 INJECTION, SOLUTION INTRAVENOUS; SUBCUTANEOUS AS NEEDED
Status: DISCONTINUED | OUTPATIENT
Start: 2019-01-01 | End: 2019-01-01

## 2019-01-01 RX ORDER — MORPHINE SULFATE 4 MG/ML
2 INJECTION INTRAVENOUS
Status: DISCONTINUED | OUTPATIENT
Start: 2019-01-01 | End: 2019-01-21 | Stop reason: HOSPADM

## 2019-01-01 RX ORDER — IPRATROPIUM BROMIDE 0.5 MG/2.5ML
0.5 SOLUTION RESPIRATORY (INHALATION)
Status: DISCONTINUED | OUTPATIENT
Start: 2019-01-01 | End: 2019-01-01 | Stop reason: HOSPADM

## 2019-01-01 RX ORDER — IPRATROPIUM BROMIDE AND ALBUTEROL SULFATE 2.5; .5 MG/3ML; MG/3ML
SOLUTION RESPIRATORY (INHALATION)
Status: COMPLETED
Start: 2019-01-01 | End: 2019-01-01

## 2019-01-01 RX ORDER — SODIUM CHLORIDE 0.9 % (FLUSH) 0.9 %
10 SYRINGE (ML) INJECTION
Status: COMPLETED | OUTPATIENT
Start: 2019-01-01 | End: 2019-01-01

## 2019-01-01 RX ORDER — MORPHINE SULFATE 4 MG/ML
4 INJECTION INTRAVENOUS
Status: DISCONTINUED | OUTPATIENT
Start: 2019-01-01 | End: 2019-01-01

## 2019-01-01 RX ORDER — SODIUM CHLORIDE 0.9 % (FLUSH) 0.9 %
5-40 SYRINGE (ML) INJECTION EVERY 8 HOURS
Status: DISCONTINUED | OUTPATIENT
Start: 2019-01-01 | End: 2019-01-01 | Stop reason: HOSPADM

## 2019-01-01 RX ORDER — LORAZEPAM 2 MG/ML
0.5 INJECTION INTRAMUSCULAR EVERY 4 HOURS
Status: DISCONTINUED | OUTPATIENT
Start: 2019-01-01 | End: 2019-01-21 | Stop reason: HOSPADM

## 2019-01-01 RX ORDER — LORAZEPAM 2 MG/ML
1 INJECTION INTRAMUSCULAR
Status: DISCONTINUED | OUTPATIENT
Start: 2019-01-01 | End: 2019-01-21 | Stop reason: HOSPADM

## 2019-01-01 RX ORDER — LORAZEPAM 2 MG/ML
0.5 INJECTION INTRAMUSCULAR ONCE
Status: COMPLETED | OUTPATIENT
Start: 2019-01-01 | End: 2019-01-01

## 2019-01-01 RX ORDER — ACETAMINOPHEN 650 MG/1
650 SUPPOSITORY RECTAL
Status: DISCONTINUED | OUTPATIENT
Start: 2019-01-01 | End: 2019-01-21 | Stop reason: HOSPADM

## 2019-01-01 RX ORDER — HEPARIN SODIUM 5000 [USP'U]/ML
5000 INJECTION, SOLUTION INTRAVENOUS; SUBCUTANEOUS EVERY 12 HOURS
Status: DISCONTINUED | OUTPATIENT
Start: 2019-01-01 | End: 2019-01-01

## 2019-01-01 RX ORDER — GLYCOPYRROLATE 0.2 MG/ML
0.2 INJECTION INTRAMUSCULAR; INTRAVENOUS EVERY 4 HOURS
Status: DISCONTINUED | OUTPATIENT
Start: 2019-01-01 | End: 2019-01-21 | Stop reason: HOSPADM

## 2019-01-01 RX ORDER — MORPHINE SULFATE 100 MG/5ML
5 SOLUTION ORAL
Status: DISCONTINUED | OUTPATIENT
Start: 2019-01-01 | End: 2019-01-01

## 2019-01-01 RX ORDER — GLYCOPYRROLATE 0.2 MG/ML
0.2 INJECTION INTRAMUSCULAR; INTRAVENOUS
Status: DISCONTINUED | OUTPATIENT
Start: 2019-01-01 | End: 2019-01-01 | Stop reason: HOSPADM

## 2019-01-01 RX ORDER — LIDOCAINE HYDROCHLORIDE 10 MG/ML
8 INJECTION, SOLUTION EPIDURAL; INFILTRATION; INTRACAUDAL; PERINEURAL
Status: DISPENSED | OUTPATIENT
Start: 2019-01-01 | End: 2019-01-01

## 2019-01-01 RX ORDER — KETOROLAC TROMETHAMINE 30 MG/ML
30 INJECTION, SOLUTION INTRAMUSCULAR; INTRAVENOUS
Status: DISCONTINUED | OUTPATIENT
Start: 2019-01-01 | End: 2019-01-21 | Stop reason: HOSPADM

## 2019-01-01 RX ORDER — SODIUM CHLORIDE 0.9 % (FLUSH) 0.9 %
5-40 SYRINGE (ML) INJECTION AS NEEDED
Status: DISCONTINUED | OUTPATIENT
Start: 2019-01-01 | End: 2019-01-01 | Stop reason: HOSPADM

## 2019-01-01 RX ORDER — GUAIFENESIN 600 MG/1
600 TABLET, EXTENDED RELEASE ORAL 2 TIMES DAILY
Status: DISCONTINUED | OUTPATIENT
Start: 2019-01-01 | End: 2019-01-01

## 2019-01-01 RX ORDER — FUROSEMIDE 10 MG/ML
40 INJECTION INTRAMUSCULAR; INTRAVENOUS DAILY
Status: DISCONTINUED | OUTPATIENT
Start: 2019-01-01 | End: 2019-01-01

## 2019-01-01 RX ORDER — HEPARIN SODIUM 10000 [USP'U]/100ML
12-25 INJECTION, SOLUTION INTRAVENOUS
Status: DISCONTINUED | OUTPATIENT
Start: 2019-01-01 | End: 2019-01-01

## 2019-01-01 RX ORDER — IPRATROPIUM BROMIDE 0.5 MG/2.5ML
0.5 SOLUTION RESPIRATORY (INHALATION)
Status: ACTIVE | OUTPATIENT
Start: 2019-01-01 | End: 2019-01-01

## 2019-01-01 RX ORDER — LEVALBUTEROL INHALATION SOLUTION 0.63 MG/3ML
0.63 SOLUTION RESPIRATORY (INHALATION)
Status: DISPENSED | OUTPATIENT
Start: 2019-01-01 | End: 2019-01-01

## 2019-01-01 RX ORDER — SCOLOPAMINE TRANSDERMAL SYSTEM 1 MG/1
1 PATCH, EXTENDED RELEASE TRANSDERMAL
Status: DISCONTINUED | OUTPATIENT
Start: 2019-01-01 | End: 2019-01-21 | Stop reason: HOSPADM

## 2019-01-01 RX ORDER — FUROSEMIDE 10 MG/ML
40 INJECTION INTRAMUSCULAR; INTRAVENOUS
Status: COMPLETED | OUTPATIENT
Start: 2019-01-01 | End: 2019-01-01

## 2019-01-01 RX ORDER — LEVALBUTEROL INHALATION SOLUTION 1.25 MG/3ML
1.25 SOLUTION RESPIRATORY (INHALATION)
Status: DISCONTINUED | OUTPATIENT
Start: 2019-01-01 | End: 2019-01-01 | Stop reason: HOSPADM

## 2019-01-01 RX ORDER — LORAZEPAM 2 MG/ML
1 INJECTION INTRAMUSCULAR
Status: DISCONTINUED | OUTPATIENT
Start: 2019-01-01 | End: 2019-01-01 | Stop reason: HOSPADM

## 2019-01-01 RX ORDER — METOPROLOL TARTRATE 5 MG/5ML
5 INJECTION INTRAVENOUS ONCE
Status: COMPLETED | OUTPATIENT
Start: 2019-01-01 | End: 2019-01-01

## 2019-01-01 RX ADMIN — LEVOFLOXACIN 750 MG: 5 INJECTION, SOLUTION INTRAVENOUS at 20:01

## 2019-01-01 RX ADMIN — GLYCOPYRROLATE 0.2 MG: 0.2 INJECTION, SOLUTION INTRAMUSCULAR; INTRAVENOUS at 12:00

## 2019-01-01 RX ADMIN — IPRATROPIUM BROMIDE 0.5 MG: 0.5 SOLUTION RESPIRATORY (INHALATION) at 01:48

## 2019-01-01 RX ADMIN — IPRATROPIUM BROMIDE 0.5 MG: 0.5 SOLUTION RESPIRATORY (INHALATION) at 00:45

## 2019-01-01 RX ADMIN — MORPHINE SULFATE 5 MG: 10 INJECTION INTRAVENOUS at 09:19

## 2019-01-01 RX ADMIN — LORAZEPAM 1 MG: 2 INJECTION INTRAMUSCULAR; INTRAVENOUS at 04:26

## 2019-01-01 RX ADMIN — METHYLPREDNISOLONE SODIUM SUCCINATE 40 MG: 40 INJECTION, POWDER, FOR SOLUTION INTRAMUSCULAR; INTRAVENOUS at 09:53

## 2019-01-01 RX ADMIN — LORAZEPAM 1 MG: 2 INJECTION INTRAMUSCULAR; INTRAVENOUS at 13:41

## 2019-01-01 RX ADMIN — LEVALBUTEROL HYDROCHLORIDE 1.25 MG: 1.25 SOLUTION RESPIRATORY (INHALATION) at 16:43

## 2019-01-01 RX ADMIN — LEVALBUTEROL HYDROCHLORIDE 1.25 MG: 1.25 SOLUTION RESPIRATORY (INHALATION) at 05:20

## 2019-01-01 RX ADMIN — LORAZEPAM 1 MG: 2 INJECTION INTRAMUSCULAR; INTRAVENOUS at 23:06

## 2019-01-01 RX ADMIN — MORPHINE SULFATE 2 MG: 2 INJECTION, SOLUTION INTRAMUSCULAR; INTRAVENOUS at 00:15

## 2019-01-01 RX ADMIN — LORAZEPAM 1 MG: 2 INJECTION INTRAMUSCULAR; INTRAVENOUS at 09:57

## 2019-01-01 RX ADMIN — LORAZEPAM 1 MG: 2 INJECTION INTRAMUSCULAR; INTRAVENOUS at 07:22

## 2019-01-01 RX ADMIN — MORPHINE SULFATE 2 MG: 2 INJECTION, SOLUTION INTRAMUSCULAR; INTRAVENOUS at 14:00

## 2019-01-01 RX ADMIN — LORAZEPAM 0.5 MG: 2 INJECTION INTRAMUSCULAR; INTRAVENOUS at 08:00

## 2019-01-01 RX ADMIN — MORPHINE SULFATE 5 MG: 10 INJECTION INTRAVENOUS at 03:54

## 2019-01-01 RX ADMIN — IPRATROPIUM BROMIDE 0.5 MG: 0.5 SOLUTION RESPIRATORY (INHALATION) at 07:44

## 2019-01-01 RX ADMIN — LORAZEPAM 0.5 MG: 2 INJECTION INTRAMUSCULAR; INTRAVENOUS at 00:11

## 2019-01-01 RX ADMIN — HEPARIN SODIUM 1400 UNITS: 5000 INJECTION, SOLUTION INTRAVENOUS; SUBCUTANEOUS at 01:22

## 2019-01-01 RX ADMIN — Medication 10 ML: at 17:28

## 2019-01-01 RX ADMIN — LORAZEPAM 1 MG: 2 INJECTION INTRAMUSCULAR; INTRAVENOUS at 18:29

## 2019-01-01 RX ADMIN — LEVALBUTEROL HYDROCHLORIDE 1.25 MG: 1.25 SOLUTION RESPIRATORY (INHALATION) at 22:24

## 2019-01-01 RX ADMIN — LORAZEPAM 0.5 MG: 2 INJECTION INTRAMUSCULAR; INTRAVENOUS at 05:27

## 2019-01-01 RX ADMIN — METHYLPREDNISOLONE SODIUM SUCCINATE 40 MG: 40 INJECTION, POWDER, FOR SOLUTION INTRAMUSCULAR; INTRAVENOUS at 08:58

## 2019-01-01 RX ADMIN — ASPIRIN 81 MG 81 MG: 81 TABLET ORAL at 09:47

## 2019-01-01 RX ADMIN — IPRATROPIUM BROMIDE 0.5 MG: 0.5 SOLUTION RESPIRATORY (INHALATION) at 21:52

## 2019-01-01 RX ADMIN — MORPHINE SULFATE 2 MG: 2 INJECTION, SOLUTION INTRAMUSCULAR; INTRAVENOUS at 12:21

## 2019-01-01 RX ADMIN — MAGNESIUM SULFATE HEPTAHYDRATE 1 G: 1 INJECTION, SOLUTION INTRAVENOUS at 17:14

## 2019-01-01 RX ADMIN — LORAZEPAM 0.5 MG: 2 INJECTION INTRAMUSCULAR; INTRAVENOUS at 20:49

## 2019-01-01 RX ADMIN — LORAZEPAM 1 MG: 2 INJECTION INTRAMUSCULAR; INTRAVENOUS at 12:47

## 2019-01-01 RX ADMIN — CARVEDILOL 3.12 MG: 3.12 TABLET, FILM COATED ORAL at 08:24

## 2019-01-01 RX ADMIN — MORPHINE SULFATE 5 MG: 100 SOLUTION ORAL at 11:42

## 2019-01-01 RX ADMIN — CARVEDILOL 3.12 MG: 3.12 TABLET, FILM COATED ORAL at 09:47

## 2019-01-01 RX ADMIN — IPRATROPIUM BROMIDE 0.5 MG: 0.5 SOLUTION RESPIRATORY (INHALATION) at 05:20

## 2019-01-01 RX ADMIN — Medication 10 ML: at 04:17

## 2019-01-01 RX ADMIN — GLYCOPYRROLATE 0.2 MG: 0.2 INJECTION, SOLUTION INTRAMUSCULAR; INTRAVENOUS at 20:49

## 2019-01-01 RX ADMIN — METHYLPREDNISOLONE SODIUM SUCCINATE 40 MG: 40 INJECTION, POWDER, FOR SOLUTION INTRAMUSCULAR; INTRAVENOUS at 10:16

## 2019-01-01 RX ADMIN — METHYLPREDNISOLONE SODIUM SUCCINATE 40 MG: 40 INJECTION, POWDER, FOR SOLUTION INTRAMUSCULAR; INTRAVENOUS at 20:48

## 2019-01-01 RX ADMIN — Medication 10 ML: at 21:45

## 2019-01-01 RX ADMIN — LEVALBUTEROL HYDROCHLORIDE 1.25 MG: 1.25 SOLUTION RESPIRATORY (INHALATION) at 00:09

## 2019-01-01 RX ADMIN — LORAZEPAM 1 MG: 2 INJECTION INTRAMUSCULAR; INTRAVENOUS at 23:56

## 2019-01-01 RX ADMIN — METHYLPREDNISOLONE SODIUM SUCCINATE 40 MG: 40 INJECTION, POWDER, FOR SOLUTION INTRAMUSCULAR; INTRAVENOUS at 08:01

## 2019-01-01 RX ADMIN — HEPARIN SODIUM 2800 UNITS: 5000 INJECTION INTRAVENOUS; SUBCUTANEOUS at 20:49

## 2019-01-01 RX ADMIN — LEVOFLOXACIN 750 MG: 5 INJECTION, SOLUTION INTRAVENOUS at 20:48

## 2019-01-01 RX ADMIN — MORPHINE SULFATE 2 MG: 2 INJECTION, SOLUTION INTRAMUSCULAR; INTRAVENOUS at 22:36

## 2019-01-01 RX ADMIN — LEVALBUTEROL HYDROCHLORIDE 1.25 MG: 1.25 SOLUTION RESPIRATORY (INHALATION) at 15:37

## 2019-01-01 RX ADMIN — METHYLPREDNISOLONE SODIUM SUCCINATE 40 MG: 40 INJECTION, POWDER, FOR SOLUTION INTRAMUSCULAR; INTRAVENOUS at 04:49

## 2019-01-01 RX ADMIN — MORPHINE SULFATE 2 MG: 2 INJECTION, SOLUTION INTRAMUSCULAR; INTRAVENOUS at 02:26

## 2019-01-01 RX ADMIN — FLUTICASONE FUROATE AND VILANTEROL TRIFENATATE 1 PUFF: 100; 25 POWDER RESPIRATORY (INHALATION) at 08:02

## 2019-01-01 RX ADMIN — LORAZEPAM 1 MG: 2 INJECTION INTRAMUSCULAR; INTRAVENOUS at 10:18

## 2019-01-01 RX ADMIN — IOPAMIDOL 100 ML: 755 INJECTION, SOLUTION INTRAVENOUS at 17:28

## 2019-01-01 RX ADMIN — LEVALBUTEROL HYDROCHLORIDE 1.25 MG: 1.25 SOLUTION RESPIRATORY (INHALATION) at 07:44

## 2019-01-01 RX ADMIN — LORAZEPAM 1 MG: 2 INJECTION INTRAMUSCULAR; INTRAVENOUS at 00:20

## 2019-01-01 RX ADMIN — GUAIFENESIN 600 MG: 600 TABLET, EXTENDED RELEASE ORAL at 08:24

## 2019-01-01 RX ADMIN — ACETAMINOPHEN 650 MG: 650 SUPPOSITORY RECTAL at 13:09

## 2019-01-01 RX ADMIN — MORPHINE SULFATE 2 MG: 2 INJECTION, SOLUTION INTRAMUSCULAR; INTRAVENOUS at 17:59

## 2019-01-01 RX ADMIN — GLYCOPYRROLATE 0.2 MG: 0.2 INJECTION, SOLUTION INTRAMUSCULAR; INTRAVENOUS at 15:02

## 2019-01-01 RX ADMIN — SODIUM CHLORIDE 100 ML: 900 INJECTION, SOLUTION INTRAVENOUS at 14:20

## 2019-01-01 RX ADMIN — MORPHINE SULFATE 5 MG: 10 INJECTION INTRAVENOUS at 10:19

## 2019-01-01 RX ADMIN — CARVEDILOL 3.12 MG: 3.12 TABLET, FILM COATED ORAL at 08:12

## 2019-01-01 RX ADMIN — LORAZEPAM 0.5 MG: 2 INJECTION INTRAMUSCULAR; INTRAVENOUS at 08:25

## 2019-01-01 RX ADMIN — METHYLPREDNISOLONE SODIUM SUCCINATE 40 MG: 40 INJECTION, POWDER, FOR SOLUTION INTRAMUSCULAR; INTRAVENOUS at 20:00

## 2019-01-01 RX ADMIN — LORAZEPAM 0.5 MG: 2 INJECTION INTRAMUSCULAR; INTRAVENOUS at 12:00

## 2019-01-01 RX ADMIN — LORAZEPAM 1 MG: 2 INJECTION INTRAMUSCULAR; INTRAVENOUS at 05:42

## 2019-01-01 RX ADMIN — IPRATROPIUM BROMIDE 0.5 MG: 0.5 SOLUTION RESPIRATORY (INHALATION) at 15:15

## 2019-01-01 RX ADMIN — HEPARIN SODIUM AND DEXTROSE 12 UNITS/KG/HR: 10000; 5 INJECTION INTRAVENOUS at 20:51

## 2019-01-01 RX ADMIN — Medication 10 ML: at 20:53

## 2019-01-01 RX ADMIN — IOPAMIDOL 100 ML: 612 INJECTION, SOLUTION INTRAVENOUS at 14:20

## 2019-01-01 RX ADMIN — SODIUM CHLORIDE 50 ML/HR: 900 INJECTION, SOLUTION INTRAVENOUS at 17:28

## 2019-01-01 RX ADMIN — IPRATROPIUM BROMIDE 0.5 MG: 0.5 SOLUTION RESPIRATORY (INHALATION) at 07:27

## 2019-01-01 RX ADMIN — MORPHINE SULFATE 2 MG: 2 INJECTION, SOLUTION INTRAMUSCULAR; INTRAVENOUS at 06:07

## 2019-01-01 RX ADMIN — GLYCOPYRROLATE 0.2 MG: 0.2 INJECTION, SOLUTION INTRAMUSCULAR; INTRAVENOUS at 00:14

## 2019-01-01 RX ADMIN — GLYCOPYRROLATE 0.2 MG: 0.2 INJECTION, SOLUTION INTRAMUSCULAR; INTRAVENOUS at 16:17

## 2019-01-01 RX ADMIN — MORPHINE SULFATE 2 MG: 2 INJECTION, SOLUTION INTRAMUSCULAR; INTRAVENOUS at 16:17

## 2019-01-01 RX ADMIN — LEVALBUTEROL HYDROCHLORIDE 1.25 MG: 1.25 SOLUTION RESPIRATORY (INHALATION) at 07:26

## 2019-01-01 RX ADMIN — LORAZEPAM 0.5 MG: 2 INJECTION INTRAMUSCULAR; INTRAVENOUS at 04:49

## 2019-01-01 RX ADMIN — MORPHINE SULFATE 5 MG: 100 SOLUTION ORAL at 00:25

## 2019-01-01 RX ADMIN — METHYLPREDNISOLONE SODIUM SUCCINATE 40 MG: 40 INJECTION, POWDER, FOR SOLUTION INTRAMUSCULAR; INTRAVENOUS at 21:44

## 2019-01-01 RX ADMIN — GUAIFENESIN 600 MG: 600 TABLET, EXTENDED RELEASE ORAL at 17:23

## 2019-01-01 RX ADMIN — GUAIFENESIN 600 MG: 600 TABLET, EXTENDED RELEASE ORAL at 09:48

## 2019-01-01 RX ADMIN — GLYCOPYRROLATE 0.2 MG: 0.2 INJECTION, SOLUTION INTRAMUSCULAR; INTRAVENOUS at 08:00

## 2019-01-01 RX ADMIN — HEPARIN SODIUM AND DEXTROSE 20 UNITS/KG/HR: 10000; 5 INJECTION INTRAVENOUS at 03:19

## 2019-01-01 RX ADMIN — LORAZEPAM 1 MG: 2 INJECTION INTRAMUSCULAR; INTRAVENOUS at 10:22

## 2019-01-01 RX ADMIN — Medication 10 ML: at 03:54

## 2019-01-01 RX ADMIN — ASPIRIN 81 MG 81 MG: 81 TABLET ORAL at 20:49

## 2019-01-01 RX ADMIN — METOPROLOL TARTRATE 5 MG: 5 INJECTION, SOLUTION INTRAVENOUS at 10:17

## 2019-01-01 RX ADMIN — FUROSEMIDE 40 MG: 10 INJECTION, SOLUTION INTRAMUSCULAR; INTRAVENOUS at 08:25

## 2019-01-01 RX ADMIN — LORAZEPAM 0.5 MG: 2 INJECTION INTRAMUSCULAR; INTRAVENOUS at 16:17

## 2019-01-01 RX ADMIN — MORPHINE SULFATE 2 MG: 2 INJECTION, SOLUTION INTRAMUSCULAR; INTRAVENOUS at 14:25

## 2019-01-01 RX ADMIN — LEVALBUTEROL HYDROCHLORIDE 1.25 MG: 1.25 SOLUTION RESPIRATORY (INHALATION) at 01:48

## 2019-01-01 RX ADMIN — Medication 10 ML: at 14:20

## 2019-01-01 RX ADMIN — LEVALBUTEROL HYDROCHLORIDE 1.25 MG: 1.25 SOLUTION RESPIRATORY (INHALATION) at 15:15

## 2019-01-01 RX ADMIN — LEVALBUTEROL HYDROCHLORIDE 1.25 MG: 1.25 SOLUTION RESPIRATORY (INHALATION) at 19:06

## 2019-01-01 RX ADMIN — Medication 10 ML: at 04:25

## 2019-01-01 RX ADMIN — LORAZEPAM 1 MG: 2 INJECTION INTRAMUSCULAR; INTRAVENOUS at 18:33

## 2019-01-01 RX ADMIN — FLUTICASONE FUROATE AND VILANTEROL TRIFENATATE 1 PUFF: 100; 25 POWDER RESPIRATORY (INHALATION) at 10:42

## 2019-01-01 RX ADMIN — MORPHINE SULFATE 5 MG: 10 INJECTION INTRAVENOUS at 14:17

## 2019-01-01 RX ADMIN — METHYLPREDNISOLONE SODIUM SUCCINATE 40 MG: 40 INJECTION, POWDER, FOR SOLUTION INTRAMUSCULAR; INTRAVENOUS at 20:53

## 2019-01-01 RX ADMIN — GLYCOPYRROLATE 0.2 MG: 0.2 INJECTION, SOLUTION INTRAMUSCULAR; INTRAVENOUS at 12:20

## 2019-01-01 RX ADMIN — Medication 10 ML: at 17:47

## 2019-01-01 RX ADMIN — MORPHINE SULFATE 2 MG: 2 INJECTION, SOLUTION INTRAMUSCULAR; INTRAVENOUS at 08:00

## 2019-01-01 RX ADMIN — MORPHINE SULFATE 4 MG: 4 INJECTION INTRAVENOUS at 11:15

## 2019-01-01 RX ADMIN — CARVEDILOL 3.12 MG: 3.12 TABLET, FILM COATED ORAL at 17:22

## 2019-01-01 RX ADMIN — Medication 10 ML: at 08:58

## 2019-01-01 RX ADMIN — IPRATROPIUM BROMIDE 0.5 MG: 0.5 SOLUTION RESPIRATORY (INHALATION) at 11:30

## 2019-01-01 RX ADMIN — IPRATROPIUM BROMIDE AND ALBUTEROL SULFATE 3 ML: 2.5; .5 SOLUTION RESPIRATORY (INHALATION) at 14:36

## 2019-01-01 RX ADMIN — IPRATROPIUM BROMIDE 0.5 MG: 0.5 SOLUTION RESPIRATORY (INHALATION) at 22:24

## 2019-01-01 RX ADMIN — LEVOFLOXACIN 750 MG: 5 INJECTION, SOLUTION INTRAVENOUS at 20:52

## 2019-01-01 RX ADMIN — IPRATROPIUM BROMIDE 0.5 MG: 0.5 SOLUTION RESPIRATORY (INHALATION) at 19:06

## 2019-01-01 RX ADMIN — IPRATROPIUM BROMIDE 0.5 MG: 0.5 SOLUTION RESPIRATORY (INHALATION) at 16:43

## 2019-01-01 RX ADMIN — LORAZEPAM 1 MG: 2 INJECTION INTRAMUSCULAR; INTRAVENOUS at 15:01

## 2019-01-01 RX ADMIN — LEVALBUTEROL HYDROCHLORIDE 1.25 MG: 1.25 SOLUTION RESPIRATORY (INHALATION) at 12:05

## 2019-01-01 RX ADMIN — MORPHINE SULFATE 2 MG: 2 INJECTION, SOLUTION INTRAMUSCULAR; INTRAVENOUS at 20:49

## 2019-01-01 RX ADMIN — MORPHINE SULFATE 5 MG: 10 INJECTION INTRAVENOUS at 03:38

## 2019-01-01 RX ADMIN — IPRATROPIUM BROMIDE 0.5 MG: 0.5 SOLUTION RESPIRATORY (INHALATION) at 00:09

## 2019-01-01 RX ADMIN — MORPHINE SULFATE 5 MG: 10 INJECTION INTRAVENOUS at 17:48

## 2019-01-01 RX ADMIN — LORAZEPAM 0.5 MG: 2 INJECTION INTRAMUSCULAR; INTRAVENOUS at 12:21

## 2019-01-01 RX ADMIN — GLYCOPYRROLATE 0.2 MG: 0.2 INJECTION, SOLUTION INTRAMUSCULAR; INTRAVENOUS at 05:26

## 2019-01-01 RX ADMIN — GLYCOPYRROLATE 0.2 MG: 0.2 INJECTION, SOLUTION INTRAMUSCULAR; INTRAVENOUS at 10:17

## 2019-01-01 RX ADMIN — FLUTICASONE FUROATE AND VILANTEROL TRIFENATATE 1 PUFF: 100; 25 POWDER RESPIRATORY (INHALATION) at 10:02

## 2019-01-01 RX ADMIN — IPRATROPIUM BROMIDE 0.5 MG: 0.5 SOLUTION RESPIRATORY (INHALATION) at 12:05

## 2019-01-01 RX ADMIN — MORPHINE SULFATE 5 MG: 10 INJECTION INTRAVENOUS at 12:09

## 2019-01-01 RX ADMIN — HEPARIN SODIUM 2800 UNITS: 5000 INJECTION, SOLUTION INTRAVENOUS; SUBCUTANEOUS at 04:19

## 2019-01-01 RX ADMIN — Medication 10 ML: at 04:27

## 2019-01-01 RX ADMIN — MORPHINE SULFATE 5 MG: 100 SOLUTION ORAL at 13:24

## 2019-01-01 RX ADMIN — Medication 10 ML: at 00:20

## 2019-01-01 RX ADMIN — IPRATROPIUM BROMIDE AND ALBUTEROL SULFATE 3 ML: .5; 3 SOLUTION RESPIRATORY (INHALATION) at 19:56

## 2019-01-01 RX ADMIN — Medication 10 ML: at 22:43

## 2019-01-01 RX ADMIN — Medication 10 ML: at 15:10

## 2019-01-01 RX ADMIN — LEVALBUTEROL HYDROCHLORIDE 1.25 MG: 1.25 SOLUTION RESPIRATORY (INHALATION) at 00:13

## 2019-01-01 RX ADMIN — IPRATROPIUM BROMIDE 0.5 MG: 0.5 SOLUTION RESPIRATORY (INHALATION) at 15:37

## 2019-01-01 RX ADMIN — HEPARIN SODIUM 2800 UNITS: 5000 INJECTION, SOLUTION INTRAVENOUS; SUBCUTANEOUS at 12:17

## 2019-01-01 RX ADMIN — IPRATROPIUM BROMIDE AND ALBUTEROL SULFATE 3 ML: .5; 3 SOLUTION RESPIRATORY (INHALATION) at 14:36

## 2019-01-01 RX ADMIN — HEPARIN SODIUM AND DEXTROSE 20 UNITS/KG/HR: 10000; 5 INJECTION INTRAVENOUS at 12:03

## 2019-01-01 SDOH — SOCIAL STABILITY - SOCIAL INSECURITY: OTHER SPECIFIED PROBLEMS RELATED TO PRIMARY SUPPORT GROUP: Z63.8

## 2019-01-15 PROBLEM — R06.02 SOB (SHORTNESS OF BREATH): Status: ACTIVE | Noted: 2019-01-01

## 2019-01-15 NOTE — ED NOTES
Assumed care of patient. She presents to ED via EMS with c/o respiratory distress. Patient has audible wheezing when she came into room. Patient reports that she has been coughing x1 week and was seen at [facility].  She reports that she had a chest CT and it showed \"a spot on the lung and I was told it was cancer. \"  Patient was started on cough medications and inhaler, she states her cough improved. At 1300 today patient began to have a coughing episode and then became distressed with her breathing and had wheezing.

## 2019-01-15 NOTE — LETTER
[address] 
[facility] EMERGENCY DEPT 
[address] [place] [address] [address] [phone] 
[phone] Work 
 
Date: 1/15/2019 To Whom It May concern: 
 
Katelyn Martinez was present today (1/15/2019) with Nile Caruso in the emergency room. Attending Provider: Subhash Gray MD. Katelyn Martinez may return to work on 1/16/2019. Sincerely, Ml Sterling MD

## 2019-01-15 NOTE — ED NOTES
Pt received from [facility] RN resting on cart with family at bedside. Pt reports feeling much less SOB. Coarse wheezing lung sounds noted upon auscultation. Oxygen saturation and respirations WNL.

## 2019-01-15 NOTE — LETTER
[address] 
[facility] EMERGENCY DEPT 
[address] [place] [address] [phone] [phone] Work 
 
Date: 1/15/2019 To Whom It May concern: 
 
Varinder Stratton was present today (1/15/2019) with Brian Mitchell in the emergency room. Attending Provider: Keira Verde MD. Varinder Stratton may return to work on 1/16/2019. Sincerely, Erika Womack MD

## 2019-01-15 NOTE — ED PROVIDER NOTES
EMERGENCY DEPARTMENT HISTORY AND PHYSICAL EXAM 
 
 
Date: 1/15/2019 Patient Name: Rajwinder Navarro History of Presenting Illness Chief Complaint Patient presents with  Shortness of Breath Arrives via EMS for SOB x today Pt given DuoNeb en route to ED Pt seen by PCP last week and given Rx Inhaler History Provided By: Patient HPI: Rajwinder Navarro, 66 y.o. female with PMHx significant for GERD, presents via EMS to the ED with cc of moderate SOB PTA. She reports cough x 1 week. Pt states that she was sitting at her kitchen table when her throat became scratchy which caused a coughing fit. She states she could not stop coughing, which caused her SOB. EMS reports giving pt neb and put her on 6 L of O2 via NC and her condition improved. Pt reports she has been seen by her PCP for her cough who did a CT on 1/8. Pt notes she was told to follow up with pulmonology for a bronchoscopy. PCP also prescribed inhaler. She denies associated CP or leg pain. She states she is a former smoker. Pt denies FMHx of heart problems, DM, or CA. There are no other complaints, changes, or physical findings at this time. PCP: Enrique Hein MD 
 
No current facility-administered medications on file prior to encounter. Current Outpatient Medications on File Prior to Encounter Medication Sig Dispense Refill  carvedilol (COREG) 3.125 mg tablet take 1 tablet by mouth twice a day 180 Tab 3  
 losartan-hydroCHLOROthiazide (HYZAAR) 100-12.5 mg per tablet take 1 tablet by mouth daily 90 Tab 3  
 UBIDECARENONE (COENZYME Q10) 100 mg Tab Take 200 mg by mouth.  MULTIVIT WITH CALCIUM,IRON,MIN (ONE-A-DAY WOMENS FORMULA PO) Take  by mouth.  ibuprofen (ADVIL) 200 mg tablet Take  by mouth every six (6) hours as needed. Past History Past Medical History: 
Past Medical History:  
Diagnosis Date  Dizziness and giddiness  GERD (gastroesophageal reflux disease)  Tobacco use disorder Past Surgical History: 
History reviewed. No pertinent surgical history. Family History: 
History reviewed. No pertinent family history. Social History: 
Social History Tobacco Use  Smoking status: Former Smoker Packs/day: 1.50 Years: 20.00 Pack years: 30.00 Types: Cigarettes  Smokeless tobacco: Never Used  Tobacco comment: 2-3 CIAGARETTES DAILY OR SOMETIMES Substance Use Topics  Alcohol use: No  
  Alcohol/week: 0.0 oz  Drug use: No  
 
 
Allergies: 
No Known Allergies Review of Systems Review of Systems Constitutional: Negative for fever. HENT: Negative for congestion. Eyes: Negative. Respiratory: Positive for cough and shortness of breath. Cardiovascular: Negative for chest pain and leg swelling. Gastrointestinal: Negative for abdominal pain. Endocrine: Negative for heat intolerance. Genitourinary: Negative for flank pain. Musculoskeletal: Negative for back pain. Skin: Negative for rash. Allergic/Immunologic: Negative for food allergies. Neurological: Negative for dizziness. Hematological: Does not bruise/bleed easily. Psychiatric/Behavioral: Negative. All other systems reviewed and are negative. Physical Exam  
Physical Exam  
Constitutional: She is oriented to person, place, and time. She appears well-developed and well-nourished. No distress. HENT:  
Head: Normocephalic and atraumatic. Eyes: EOM are normal. Pupils are equal, round, and reactive to light. Neck: Normal range of motion. Neck supple. Cardiovascular: Regular rhythm and normal heart sounds. Tachycardia present. Pulmonary/Chest: Effort normal. No respiratory distress. She has rhonchi. Abdominal: Soft. Bowel sounds are normal. She exhibits no mass. There is no tenderness. Musculoskeletal: Normal range of motion. She exhibits no edema. Neurological: She is alert and oriented to person, place, and time. Coordination normal.  
Skin: Skin is warm and dry. Psychiatric: She has a normal mood and affect. Her behavior is normal.  
Nursing note and vitals reviewed. Diagnostic Study Results Labs - Recent Results (from the past 12 hour(s)) CBC WITH AUTOMATED DIFF Collection Time: 01/15/19  3:48 PM  
Result Value Ref Range WBC 16.6 (H) 3.6 - 11.0 K/uL  
 RBC 3.72 (L) 3.80 - 5.20 M/uL  
 HGB 10.9 (L) 11.5 - 16.0 g/dL HCT 33.3 (L) 35.0 - 47.0 % MCV 89.5 80.0 - 99.0 FL  
 MCH 29.3 26.0 - 34.0 PG  
 MCHC 32.7 30.0 - 36.5 g/dL  
 RDW 13.4 11.5 - 14.5 % PLATELET 062 805 - 194 K/uL MPV 10.2 8.9 - 12.9 FL  
 NRBC 0.0 0  WBC ABSOLUTE NRBC 0.00 0.00 - 0.01 K/uL NEUTROPHILS 95 (H) 32 - 75 % LYMPHOCYTES 3 (L) 12 - 49 % MONOCYTES 1 (L) 5 - 13 % EOSINOPHILS 0 0 - 7 % BASOPHILS 0 0 - 1 % IMMATURE GRANULOCYTES 1 (H) 0.0 - 0.5 % ABS. NEUTROPHILS 15.7 (H) 1.8 - 8.0 K/UL  
 ABS. LYMPHOCYTES 0.5 (L) 0.8 - 3.5 K/UL  
 ABS. MONOCYTES 0.2 0.0 - 1.0 K/UL  
 ABS. EOSINOPHILS 0.0 0.0 - 0.4 K/UL  
 ABS. BASOPHILS 0.0 0.0 - 0.1 K/UL  
 ABS. IMM. GRANS. 0.2 (H) 0.00 - 0.04 K/UL  
 DF AUTOMATED    
 RBC COMMENTS NORMOCYTIC, NORMOCHROMIC METABOLIC PANEL, COMPREHENSIVE Collection Time: 01/15/19  3:48 PM  
Result Value Ref Range Sodium 140 136 - 145 mmol/L Potassium 3.5 3.5 - 5.1 mmol/L Chloride 108 97 - 108 mmol/L  
 CO2 22 21 - 32 mmol/L Anion gap 10 5 - 15 mmol/L Glucose 144 (H) 65 - 100 mg/dL BUN 28 (H) 6 - 20 MG/DL Creatinine 0.88 0.55 - 1.02 MG/DL  
 BUN/Creatinine ratio 32 (H) 12 - 20 GFR est AA >60 >60 ml/min/1.73m2 GFR est non-AA >60 >60 ml/min/1.73m2 Calcium 8.4 (L) 8.5 - 10.1 MG/DL Bilirubin, total 0.2 0.2 - 1.0 MG/DL  
 ALT (SGPT) 57 12 - 78 U/L  
 AST (SGOT) 130 (H) 15 - 37 U/L Alk. phosphatase 159 (H) 45 - 117 U/L Protein, total 7.4 6.4 - 8.2 g/dL Albumin 2.9 (L) 3.5 - 5.0 g/dL Globulin 4.5 (H) 2.0 - 4.0 g/dL A-G Ratio 0.6 (L) 1.1 - 2.2 CK Collection Time: 01/15/19  3:48 PM  
Result Value Ref Range  26 - 192 U/L  
TROPONIN I Collection Time: 01/15/19  3:48 PM  
Result Value Ref Range Troponin-I, Qt. 1.76 (H) <0.05 ng/mL NT-PRO BNP Collection Time: 01/15/19  3:48 PM  
Result Value Ref Range NT pro- (H) 0 - 125 PG/ML  
EKG, 12 LEAD, INITIAL Collection Time: 01/15/19  3:50 PM  
Result Value Ref Range Ventricular Rate 118 BPM  
 Atrial Rate 118 BPM  
 P-R Interval 138 ms QRS Duration 72 ms Q-T Interval 346 ms  
 QTC Calculation (Bezet) 484 ms Calculated P Axis 90 degrees Calculated R Axis 18 degrees Calculated T Axis 90 degrees Diagnosis Sinus tachycardia Anteroseptal infarct , age undetermined No previous ECGs available Radiologic Studies -  
CT Results  (Last 48 hours) 01/15/19 1728  [address]A [address] Final result Impression:  IMPRESSION:  
1. No evidence of pulmonary embolus. 2.  Bulky mediastinal and bilateral hilar lymphadenopathy, narrowing the  
pulmonary arteries, trachea, and bronchi to both lungs. Findings are not  
significantly changed. 3. Background of severe emphysema. 4. Mild postobstructive airspace disease in the left upper and lower lobes. Narrative:  INDICATION:   sob, elevated d-dimer COMPARISON:  CT 1/8/2019 TECHNIQUE:  Following the uneventful intravenous administration of 100 cc Isovue  
043, thin helical axial images were obtained through the chest. Postprocessing  
was performed. 3D image postprocessing was performed. CT dose reduction was achieved through the use of a standardized protocol  
tailored for this examination and automatic exposure control for dose  
modulation. FINDINGS:  
   
Again demonstrated is bulky mediastinal and hilar lymphadenopathy, narrowing the  
trachea and both bronchi. .  The heart size is normal.  There is no pericardial  
 effusion. No filling defect is seen within the pulmonary arterial system to suggest  
pulmonary embolus. However, metastatic mediastinal lymphadenopathy narrows the  
right main pulmonary artery and left main pulmonary artery, as well as lobar  
arteries to both lungs. There is severe emphysema. Left upper and lower lobe central opacity are noted,  
and may be the source of malignancy or postobstructive airspace disease. No  
pulmonary nodule or mass is seen. There are no pleural effusions. Limited evaluation of the upper abdomen demonstrates chronic diffuse hepatic  
metastatic disease. The osseous structures are unremarkable. CXR Results  (Last 48 hours) 01/15/19 1608  XR CHEST PORT Final result Impression:  IMPRESSION:  
Moderate to severe apical emphysematous change with mediastinal and hilar  
lymphadenopathy. Ethelene Gauss Narrative:  PORTABLE CHEST RADIOGRAPH/S: 1/15/2019 4:08 PM  
   
Clinical history: Dyspnea INDICATION:   sob COMPARISON: 1/8/2019 FINDINGS:  
AP portable upright view of the chest demonstrates stable  cardiopericardial  
silhouette. The lungs are adequately expanded. Moderate to severe emphysematous  
change with apical pleural thickening and scar bilateral hilar adenopathy. Mediastinal adenopathy as well. .. The osseous structures are unremarkable. Medical Decision Making I am the first provider for this patient. I reviewed the vital signs, available nursing notes, past medical history, past surgical history, family history and social history. Vital Signs-Reviewed the patient's vital signs. Patient Vitals for the past 12 hrs: 
 Temp Pulse Resp BP SpO2  
01/15/19 1555     97 % 01/15/19 1425    118/71   
01/15/19 1415 97.7 °F (36.5 °C) (!) 118 24 118/71 97 % EKG interpretation: (Preliminary) 15:50 Rhythm: sinus tachycardia; and regular .  Rate (approx.): 118; Axis: normal; OK interval: normal; QRS interval: normal ; ST/T wave: normal; Other findings: no significant change. Written by Clay Alaniz, ED Scribe, as dictated by Yuniel Dominguez MD. Records Reviewed: Nursing Notes, Old Medical Records, Previous electrocardiograms, Previous Radiology Studies and Previous Laboratory Studies Provider Notes (Medical Decision Making): DDx: lung CA, bronchitis, PNA, CAD, CHF, URI 
 
ED Course:  
Initial assessment performed. The patients presenting problems have been discussed, and they are in agreement with the care plan formulated and outlined with them. I have encouraged them to ask questions as they arise throughout their visit. CONSULT NOTE:  
6:04 PM 
Yuniel Dominguez MD spoke with Dr. Emilia Castro, Specialty: Hospitalist 
Discussed pt's hx, disposition, and available diagnostic and imaging results. Reviewed care plans. Consultant will evaluate pt for admission. Written by Mikhail Brooks, ED Scribe, as dictated by Yuniel Dominguez MD. 
 
CONSULT NOTE: 
6:20 PM 
Yuniel Dominguez MD spoke with Dr. Elijah Desai, Specialty: Cardiology Discussed patient's hx, disposition, and available diagnostic and imaging results. Reviewed care plans. Consultant agrees with plans as outlined. Consultant states they may want to do a biopsy and does not want to start the pt on ASA yet. He does recommend getting a 3 hour set troponin. If there is no significant change, he will follow the the pt and see her in the morning. Critical Care Time:  
4:45 PM 
 
IMPENDING DETERIORATION -Respiratory, Cardiovascular, Metabolic and Hepatic ASSOCIATED RISK FACTORS - Hypoxia, Dysrhythmia, Metabolic changes and Dehydration MANAGEMENT- Bedside Assessment and Supervision of Care INTERPRETATION -  Xrays, CT Scan, ECG and Blood Pressure INTERVENTIONS - hemodynamic mngmt and Metobolic interventions CASE REVIEW - Hospitalist, Medical Sub-Specialist, Nursing and Family TREATMENT RESPONSE -Unchanged PERFORMED BY - Self NOTES   : 
I have spent 69 minutes of critical care time involved in lab review, consultations with specialist, family decision- making, bedside attention and documentation. During this entire length of time I was immediately available to the patient . Alice Stevens MD 
 
Disposition: PLAN: 
1. Admit ADMIT NOTE: 
6:04 PM 
Patient is being admitted to the hospital by Dr. Sonny Jade. The results of their tests and reasons for their admission have been discussed with them and/or available family. They convey agreement and understanding for the need to be admitted and for their admission diagnosis. Consultation has been made with the inpatient physician specialist for hospitalization. Diagnosis Clinical Impression: 1. COPD, severe ([address] 2. Elevated troponin 3. Mediastinal lymphadenopathy 4. Sinus tachycardia Attestations: This note is prepared by Gael Nicole, acting as a Scribe for MD Alice Thomas MD: The scribe's documentation has been prepared under my direction and personally reviewed by me in its entirety. I confirm that the notes above accurately reflects all work, treatment, procedures, and medical decision making performed by me.

## 2019-01-15 NOTE — H&P
Hospitalist Admission Note NAME: Miki Rivero :  1960 MRN:  [id number] Date/Time:  1/15/2019 7:05 PM 
 
Patient PCP: Shirley Richardson MD 
________________________________________________________________________ Given the patient's current clinical presentation, I have a high level of concern for decompensation if discharged from the emergency department. Complex decision making was performed, which includes reviewing the patient's available past medical records, laboratory results, and x-ray films. My assessment of this patient's clinical condition and my plan of care is as follows. Assessment / Plan: 
NSTEMI Elevated proBNP Elevated troponin EKG showed sinus tachycardia, no acute ST changes Troponin 1.76>5 Cardio Dr Buck Khan  called back regarding jump in troponin Cardio recommended heparin drip and ASA We will check 2D echo as patient has elevated troponin and proBNP 
NPO after MN Check Hba1c , lipid profile , TSH Acute respiratory failure with hypoxia most likely secondary to postobstructive pneumonia versus CO PD exacerbation CT chest 01/15 No evidence of pulmonary embolus. 2.  Bulky mediastinal and bilateral hilar lymphadenopathy, narrowing the 
pulmonary arteries, trachea, and bronchi to both lungs. Findings are not 
significantly changed. 3. Background of severe emphysema. 4. Mild postobstructive airspace disease in the left upper and lower lobes We will treat with Levaquin, duo nebs every 6 and methylprednisone 40 mg IV every 8 Prn robutussin Abnormal LFT's,liver mets ? 
 will trend, will check liver US Hypertension Sinus tachycardia Blood pressure within acceptable limits We will continue with Coreg and Cozaar/hydrochlorothiazide Will change nebs to xopenex and ipratropium Abnormal CT scan of the chest  Suspicion of metastatic small cell carcinoma [place] [facility]  Extensive mediastinal lymphadenopathy with nodular septal thickening in the 
 left lower lobe concerning for lymphangitic spread of tumor, associated with 
hepatic metastatic disease concerning for small cell carcinoma. 2. Emphysema with probable biapical lung scarring. 3. Narrowing of the left mainstem and segmental bronchi. Nodular thickening of 
the mainstem bronchi bilaterally We will keep patient n.p.o. after midnight for possible bronch Pulmonology with is consulted and oncology is consulted Underweight Protein caloric malnutrition 
dietetician consulted, prn supplements per diet Body mass index is 16.62 kg/m². therefore classifying patient as underweight 
-counseled exercise/diet. Patient receptive. I have personally reviewed the radiographs, laboratory data in Epic and decisions and statements above are based partially on this personal interpretation. Code Status: Full Code DVT Prophylaxis: Hep SQ 
GI Prophylaxis: not indicated Subjective: CHIEF COMPLAINT: SOB HISTORY OF PRESENT ILLNESS:    
Madeline Artis is a 66 y.o.  female with known history as listed below presents to ED with complaint noted above. Available records were reviewed at the time of H&P. This is a 73-year-old female with past medical history of GERD, tobacco abuse hypertension who presented to the ED for sudden onset of shortness of breath this morning associated with nonproductive cough.  Patient reported 1 week history of cough but today she felt that her throat was scratchy and had a coughing fit.  Her cough led to shortness of breath,> EMS gave her nebs and put the patient on 6 L of oxygen via nasal cannula which improved her condition.  
Patient had a CT scan of the chest done in January 8 because of the persistent cough,  CT scan of the chest was concerning for metastatic lung cancer, and she was supposed to follow-up with a pulmonologist for bronchoscopy.  In the ED, vital signs were stable except for sinus tachycardia, labs revealed elevated white blood cell count >16,000 ,elevated troponin at 1.76, elevated proBNP above 700. Cardiology was consulted by ED physician, cardio advised to trend troponin. CTA of the chest was negative for acute PE, but showed severe emphysema, mediastinal and bilateral hilar lymphadenopathy narrowing the pulmonary arteries, trachea, and bronchi to both lungs  . Mild postobstructive airspace disease in the left upper and lower lobes.  We were asked to see for work up and evaluation of the above problems. Past Medical History:  
Diagnosis Date  Dizziness and giddiness  GERD (gastroesophageal reflux disease)  Tobacco use disorder History reviewed. No pertinent surgical history. Social History Tobacco Use  Smoking status: Former Smoker Packs/day: 1.50 Years: 20.00 Pack years: 30.00 Types: Cigarettes  Smokeless tobacco: Never Used  Tobacco comment: 2-3 CIAGARETTES DAILY OR SOMETIMES Substance Use Topics  Alcohol use: No  
  Alcohol/week: 0.0 oz History reviewed. No pertinent family history. No Known Allergies Prior to Admission medications Medication Sig Start Date End Date Taking? Authorizing Provider  
carvedilol (COREG) 3.125 mg tablet take 1 tablet by mouth twice a day 8/18/18  Yes Vkia Gallardo MD  
losartan-hydroCHLOROthiazide [facility]) 100-12.5 mg per tablet take 1 tablet by mouth daily 8/18/18  Yes Vika Gallardo MD  
UBIDECARENONE (COENZYME Q10) 100 mg Tab Take 200 mg by mouth. Yes Provider, Historical  
MULTIVIT WITH CALCIUM,IRON,MIN (ONE-A-DAY WOMENS FORMULA PO) Take  by mouth. Yes Provider, Historical  
ibuprofen (ADVIL) 200 mg tablet Take  by mouth every six (6) hours as needed. Provider, Historical  
 
REVIEW OF SYSTEMS:  See HPI for details General: negative for fever, chills, sweats, weakness, weight loss Eyes: negative for blurred vision, eye pain, loss of vision, diplopia Ear Nose and Throat: negative for rhinorrhea, pharyngitis, otalgia, tinnitus, speech or swallowing difficulties Respiratory:  negative for pleuritic pain, positive for dry cough, , wheezing, SOB, BENITEZ Cardiology:  negative for chest pain, palpitations, orthopnea, PND, edema, syncope Gastrointestinal: negative for abdominal pain, N/V, dysphagia, change in bowel habits, bleeding Genitourinary: negative for frequency, urgency, dysuria, hematuria, incontinence Muskuloskeletal : negative for arthralgia, myalgia Hematology: negative for easy bruising, bleeding, lymphadenopathy Dermatological: negative for rash, ulceration, mole change, new lesion Endocrine: negative for hot flashes or polydipsia Neurological: negative for headache, dizziness, confusion, focal weakness, paresthesia, memory loss, gait disturbance Psychological: negative for anxiety, depression, agitation Objective: VITALS:   
Visit Vitals /71 Pulse (!) 118 Temp 97.7 °F (36.5 °C) Resp 24 Ht 5' 6\" (1.676 m) Wt 46.7 kg (103 lb) SpO2 97% BMI 16.62 kg/m² PHYSICAL EXAM:  
General:    Alert, cooperative, no distress, appears stated age. HEENT: Atraumatic, anicteric sclerae, pink conjunctivae No oral ulcers, mucosa moist, throat clear Neck:  Supple, symmetrical,  thyroid: non tender Lungs:   Clear to auscultation bilaterally. ++Wheezing or Rhonchi. No rales. Chest wall:  No tenderness  No Accessory muscle use. Heart:   Regular  rhythm,  No  murmur   No edema Abdomen:   Soft, non-tender. Not distended. Bowel sounds normal 
Extremities: No cyanosis. No clubbing Skin:     Not pale. Not Jaundiced  No rashes Psych:  Good insight. Not depressed. Not anxious or agitated. Neurologic: EOMs intact. No facial asymmetry. No aphasia or slurred speech. Symmetrical strength, Alert and oriented X 4. 
_______________________________________________________________________ Care Plan discussed with: 
  Comments Patient  Discussed with patient in room. POC outlined and Questions answered Family RN x Care Manager Consultant:  kin JOHNSON MD and dr Danny Vides   
_______________________________________________________________________ Recommended Disposition:  
Home with Family HH/PT/OT/RN   
SNF/LTC   
[facility]   
________________________________________________________________________ TOTAL TIME:  65 Minutes Critical Care Provided     Minutes non procedure based Comments >50% of visit spent in counseling and coordination of care  Chart review Discussion with patient and/or family and questions answered  
 
________________________________________________________________________ Signed: Lexis Macias MD 
 
This note will not be viewable in [address] Procedures: see electronic medical records for all procedures/Xrays and details which were not copied into this note but were reviewed prior to creation of Plan. LAB DATA REVIEWED:   
Recent Results (from the past 24 hour(s)) CBC WITH AUTOMATED DIFF Collection Time: 01/15/19  3:48 PM  
Result Value Ref Range WBC 16.6 (H) 3.6 - 11.0 K/uL  
 RBC 3.72 (L) 3.80 - 5.20 M/uL  
 HGB 10.9 (L) 11.5 - 16.0 g/dL HCT 33.3 (L) 35.0 - 47.0 % MCV 89.5 80.0 - 99.0 FL  
 MCH 29.3 26.0 - 34.0 PG  
 MCHC 32.7 30.0 - 36.5 g/dL  
 RDW 13.4 11.5 - 14.5 % PLATELET 364 297 - 495 K/uL MPV 10.2 8.9 - 12.9 FL  
 NRBC 0.0 0  WBC ABSOLUTE NRBC 0.00 0.00 - 0.01 K/uL NEUTROPHILS 95 (H) 32 - 75 % LYMPHOCYTES 3 (L) 12 - 49 % MONOCYTES 1 (L) 5 - 13 % EOSINOPHILS 0 0 - 7 % BASOPHILS 0 0 - 1 % IMMATURE GRANULOCYTES 1 (H) 0.0 - 0.5 % ABS. NEUTROPHILS 15.7 (H) 1.8 - 8.0 K/UL  
 ABS. LYMPHOCYTES 0.5 (L) 0.8 - 3.5 K/UL  
 ABS. MONOCYTES 0.2 0.0 - 1.0 K/UL  
 ABS. EOSINOPHILS 0.0 0.0 - 0.4 K/UL  
 ABS. BASOPHILS 0.0 0.0 - 0.1 K/UL  
 ABS. IMM. GRANS. 0.2 (H) 0.00 - 0.04 K/UL  
 DF AUTOMATED RBC COMMENTS NORMOCYTIC, NORMOCHROMIC METABOLIC PANEL, COMPREHENSIVE Collection Time: 01/15/19  3:48 PM  
Result Value Ref Range Sodium 140 136 - 145 mmol/L Potassium 3.5 3.5 - 5.1 mmol/L Chloride 108 97 - 108 mmol/L  
 CO2 22 21 - 32 mmol/L Anion gap 10 5 - 15 mmol/L Glucose 144 (H) 65 - 100 mg/dL BUN 28 (H) 6 - 20 MG/DL Creatinine 0.88 0.55 - 1.02 MG/DL  
 BUN/Creatinine ratio 32 (H) 12 - 20 GFR est AA >60 >60 ml/min/1.73m2 GFR est non-AA >60 >60 ml/min/1.73m2 Calcium 8.4 (L) 8.5 - 10.1 MG/DL Bilirubin, total 0.2 0.2 - 1.0 MG/DL  
 ALT (SGPT) 57 12 - 78 U/L  
 AST (SGOT) 130 (H) 15 - 37 U/L Alk. phosphatase 159 (H) 45 - 117 U/L Protein, total 7.4 6.4 - 8.2 g/dL Albumin 2.9 (L) 3.5 - 5.0 g/dL Globulin 4.5 (H) 2.0 - 4.0 g/dL A-G Ratio 0.6 (L) 1.1 - 2.2 CK Collection Time: 01/15/19  3:48 PM  
Result Value Ref Range  26 - 192 U/L  
TROPONIN I Collection Time: 01/15/19  3:48 PM  
Result Value Ref Range Troponin-I, Qt. 1.76 (H) <0.05 ng/mL NT-PRO BNP Collection Time: 01/15/19  3:48 PM  
Result Value Ref Range NT pro- (H) 0 - 125 PG/ML  
EKG, 12 LEAD, INITIAL Collection Time: 01/15/19  3:50 PM  
Result Value Ref Range Ventricular Rate 118 BPM  
 Atrial Rate 118 BPM  
 P-R Interval 138 ms QRS Duration 72 ms Q-T Interval 346 ms  
 QTC Calculation (Bezet) 484 ms Calculated P Axis 90 degrees Calculated R Axis 18 degrees Calculated T Axis 90 degrees Diagnosis Sinus tachycardia Anteroseptal infarct , age undetermined No previous ECGs available

## 2019-01-15 NOTE — ED NOTES
Bedside and Verbal shift change report given to Darylene Drones, RN (oncoming nurse) by Tacho Palmer RN (offgoing nurse). Report included the following information SBAR, Kardex and ED Summary.

## 2019-01-16 PROBLEM — I48.91 ATRIAL FIBRILLATION WITH RVR (HCC): Status: ACTIVE | Noted: 2019-01-01

## 2019-01-16 PROBLEM — R77.8 ELEVATED TROPONIN: Status: ACTIVE | Noted: 2019-01-01

## 2019-01-16 NOTE — PROGRESS NOTES
Speech path Indy Jain, of palliative medicine recommended that we hold our eval for tomorrow. The pt has been struggling with respirations and is not appropriate for po trials currently. Apparently HR is elevated as well so we will evaluate when more stable.   
Felipe Andrews, SLP

## 2019-01-16 NOTE — CONSULTS
[address], [address] [facility]  [phone] [address] [facility] Date of  Admission: 1/15/2019  2:16 PM  
 
Admission type:Emergency Consult for: afib with RVR, elevated troponin Consult by: hospitalist 
 
 Subjective:  
 
Brandon Madison is a 66 y.o. female admitted for SOB (shortness of breath). No previous cardiac hx. Admitted with worsening SOB, found to be in afib with RVR. Significant hx of recent CT of chest showing likely lung CA, bronch to be performed in outpt. Today US of liver shows masses/mets. ECG ST, telemetry showing afib with RVR, now SR. Troponin 1/76 - 6.49. Patient denies chest pain/discomfort, only c/o SOB. Over the last 1-2 months worsening SOB, wt down 20#s. Has had difficulty swallowing pills. Cardiac risk factors: smoking/ tobacco exposure, family history, dyslipidemia, HTN, age, female Patient Active Problem List  
 Diagnosis Date Noted  Elevated troponin 01/16/2019  Atrial fibrillation with RVR (HCC) 01/16/2019  
 SOB (shortness of breath) 01/15/2019  Generalized anxiety disorder 08/09/2018  Tobacco use disorder 04/10/2012  Essential hypertension, benign 03/13/2012  Dizziness and giddiness 03/13/2012 Hasmukh Fay MD 
Past Medical History:  
Diagnosis Date  Atrial fibrillation with RVR ([address] 1/16/2019  Dizziness and giddiness  Elevated troponin 1/16/2019  GERD (gastroesophageal reflux disease)  Tobacco use disorder Social History Socioeconomic History  Marital status:  Spouse name: Not on file  Number of children: Not on file  Years of education: Not on file  Highest education level: Not on file Tobacco Use  Smoking status: Former Smoker Packs/day: 1.50 Years: 20.00 Pack years: 30.00 Types: Cigarettes  Smokeless tobacco: Never Used  Tobacco comment: 2-3 CIAGARETTES DAILY OR SOMETIMES Substance and Sexual Activity  Alcohol use: No  
  Alcohol/week: 0.0 oz  Drug use: No  
 
No Known Allergies History reviewed. No pertinent family history. Current Facility-Administered Medications Medication Dose Route Frequency  guaiFENesin ER (MUCINEX) tablet 600 mg  600 mg Oral BID  fluticasone-vilanterol (BREO ELLIPTA) 100mcg-25mcg/puff  1 Puff Inhalation DAILY  methylPREDNISolone (PF) (SOLU-MEDROL) injection 40 mg  40 mg IntraVENous DAILY  levalbuterol (XOPENEX) nebulizer soln 0.63 mg/3 mL  0.63 mg Nebulization NOW  ipratropium (ATROVENT) 0.02 % nebulizer solution 0.5 mg  0.5 mg Nebulization NOW  
 LORazepam (ATIVAN) injection 1 mg  1 mg IntraVENous Q4H PRN  
 carvedilol (COREG) tablet 3.125 mg  3.125 mg Oral BID WITH MEALS  sodium chloride (NS) flush 5-40 mL  5-40 mL IntraVENous Q8H  
 sodium chloride (NS) flush 5-40 mL  5-40 mL IntraVENous PRN  
 levoFLOXacin (LEVAQUIN) 750 mg in D5W IVPB  750 mg IntraVENous Q24H  
 guaiFENesin (ROBITUSSIN) 100 mg/5 mL oral liquid 100 mg  100 mg Oral Q4H PRN  
 levalbuterol (XOPENEX) nebulizer soln 1.25 mg/3 mL  1.25 mg Nebulization Q4H RT  
 heparin 25,000 units in D5W 250 ml infusion  12-25 Units/kg/hr IntraVENous TITRATE  ipratropium (ATROVENT) 0.02 % nebulizer solution 0.5 mg  0.5 mg Nebulization Q4H RT  
 aspirin chewable tablet 81 mg  81 mg Oral DAILY  heparin (porcine) injection 1,400 Units  30 Units/kg IntraVENous PRN Or  
 heparin (porcine) injection 2,800 Units  60 Units/kg IntraVENous PRN Review of Symptoms:  
11 systems reviewed, negative other than as stated in the HPI Objective:  
  
Visit Vitals BP (!) 89/52 (BP 1 Location: Left arm, BP Patient Position: Sitting) Pulse 99 Temp 96.4 °F (35.8 °C) Resp 28 Ht 5' 6\" (1.676 m) Wt 46.7 kg (103 lb) SpO2 96% Breastfeeding? No  
BMI 16.62 kg/m² Physical:  
General: cachectic appearing  female in no acute distress Heart: tachy, no m/S3/JVD, no carotid bruits Lungs: rhonchi, wheezing Abdomen: Soft, +BS, NTND Extremities: LE adrian +DP/PT, no edema Neurologic: Grossly normal 
 
Data Review:  
Recent Labs  
  01/16/19 
0230 01/15/19 
2038 01/15/19 
1548 WBC 12.2* 13.5* 16.6* HGB 11.2* 11.2* 10.9* HCT 33.8* 34.4* 33.3*  
 411* 357 Recent Labs  
  01/16/19 
0230 01/15/19 
2038 01/15/19 
1548   --  140  
K 4.0  --  3.5   --  108 CO2 18*  --  22 *  --  144* BUN 29*  --  28* CREA 0.90  --  0.88 CA 8.7  --  8.4* ALB 2.9*  --  2.9* TBILI 0.3  --  0.2 SGOT 107*  --  130* ALT 58  --  57 INR  --  1.1  --   
 
 
Recent Labs  
  01/15/19 
2038 01/15/19 
1901 01/15/19 
1548 TROIQ 6.49* 5.19* 1.76* CPK  --   --  133 Intake/Output Summary (Last 24 hours) at 1/16/2019 1142 Last data filed at 1/16/2019 8731 Gross per 24 hour Intake 0 ml Output  Net 0 ml Cardiographics Telemetry: ST, afib with RVR 
ECG: ST 
Echocardiogram: pending CT of chest:  Moderate to severe apical emphysematous change with mediastinal and hilar 
lymphadenopathy. .  
  
 
 Assessment:  
  
 Principal Problem: 
  SOB (shortness of breath) (1/15/2019) Active Problems: 
  Elevated troponin (1/16/2019) Atrial fibrillation with RVR ([address] (1/16/2019) Plan:  
 
Elevated troponin/ possible NSTEMI: 
No CP, no acute ECG changes but new onset of afib with RVR Other possible rationale for elevation is supply/demand with afib/and acute respiratory failure, CA with mets, Tocco Subu cardiomyopathy is also in the differential diagnosis with acute physiologic and psychological stressors Started on IV heparin to allow for possible biopsy of liver and bronchoscopy if this is still the plan Dependent on further evaluation of underlying lung CA, will consider whether cardiac catheterization is indicated or not Echo pending Continue on ASA, BB, heparin, start statin and will check lipids in AM. Afib with RVR: 
PAF, continue on BB for rate control and heparin for now CHADS 2 vasc score: 1 Arrhythmia may be r/t lung CA Thank you for consulting RCA Leonila Capone NP Patient seen and examined by me with nurse practitioner Tin Lazar. I personally performed all components of the history, physical, and medical decision making and agree with the assessment and plan with minor modifications as noted. Acute issue is new diagnosis of suspected widely metastatic lung cancer. Also with troponin elevation that could be consistent with non-ST elevation MI versus other causes. Continue medical management pending evaluation of suspected cancer. Will discuss aggressiveness of cardiac care based on the above evaluation.

## 2019-01-16 NOTE — PROGRESS NOTES
Problem: Falls - Risk of 
Goal: *Absence of Falls Document Clide Failing Fall Risk and appropriate interventions in the flowsheet. Outcome: Progressing Towards Goal 
Fall Risk Interventions:

## 2019-01-16 NOTE — PROGRESS NOTES
Hospitalist Progress Note NAME: Craig Haley :  1960 MRN:  [id number] Assessment / Plan: 
Acute respiratory failure with hypoxia secondary to end-stage COPD with severe emphysema, metastatic cancer to liver, afib with RVR, possible NSTEMI End-stage COPD/severe emphysema Post-obstructive PNA Metastatic cancer Elevated troponin, possible NSTEMI  
afib with RVR  Troponin peaked at 6 CT chest revealed bulky mediastinal lymphadenopathy, severe emphysema, mild post-obstructive disease in the Left lung Given IV lopressor 5 mg x1 with improvement of HR to 99. Continue heparin drip, duonebs, levaquin, coreg, ASA, statin, mucinex, solumedrol. Discussed case with pulmonology team. Cardiology and oncology teams are following. Recommendation reviewed and agreed. 2D echo pending. Liver biopsy ordered. Hypertension Low normal blood pressure. Stop home losartan/HCTZ Monitor. Anxiety Goals of care Palliative care consulted. Ativan IV prn Underweight Protein caloric malnutrition 
dietetician consulted, prn supplements per diet  
  
Body mass index is 16.62 kg/m². therefore classifying patient as underweight 
  
I have personally reviewed the radiographs, laboratory data in Epic and decisions and statements above are based partially on this personal interpretation. 
  
Code Status: Full Code DVT Prophylaxis: Hep SQ 
GI Prophylaxis: not indicated Subjective: Chief Complaint / Reason for Physician Visit SOB 
 
RN called due to tachycardia in the 130s. EKG done stat. Patient is very short of breath and anxious. She feels sick. Discussed with RN events overnight. Review of Systems: 
Symptom Y/N Comments  Symptom Y/N Comments Fever/Chills n   Chest Pain n   
Poor Appetite y   Edema n   
Cough y   Abdominal Pain n   
Sputum y   Joint Pain SOB/BENITEZ y   Pruritis/Rash Nausea/vomit n   Tolerating PT/OT Diarrhea n   Tolerating Diet y Constipation n   Other Could NOT obtain due to:   
 
Objective: VITALS:  
Last 24hrs VS reviewed since prior progress note. Most recent are: 
Patient Vitals for the past 24 hrs: 
 Temp Pulse Resp BP SpO2  
01/16/19 1538 97.6 °F (36.4 °C) (!) 110 22 92/61 100 % 01/16/19 1515     98 % 01/16/19 1130     96 % 01/16/19 1054    (!) 89/52   
01/16/19 1024 96.4 °F (35.8 °C) 99 28 (!) 80/61 96 % 01/16/19 0816 97.8 °F (36.6 °C) (!) 133 26 119/84 97 % 01/16/19 0727     91 % 01/16/19 0413 97.5 °F (36.4 °C) (!) 121 22 106/78 100 % 01/16/19 0016     99 % 01/15/19 2155     97 % 01/15/19 2137 98.7 °F (37.1 °C) 97 22 114/71 97 % Intake/Output Summary (Last 24 hours) at 1/16/2019 1604 Last data filed at 1/16/2019 1674 Gross per 24 hour Intake 0 ml Output  Net 0 ml PHYSICAL EXAM: 
General: Alert, cooperative, mi moderate respiratory distress   
EENT:  Anicteric sclerae. Resp:  Diminished air entry bilaterally, diffuse wheezes. Positive accessory muscle use CV:  Tachcyardia, S1 S2 present  No edema GI:  Soft, Non distended, Non tender.  +Bowel sounds Neurologic:  Alert and oriented X 3, normal speech,  
Skin:  No rashes. No jaundice Reviewed most current lab test results and cultures  YES Reviewed most current radiology test results   YES Review and summation of old records today    NO Reviewed patient's current orders and MAR    YES 
PMH/SH reviewed - no change compared to H&P 
________________________________________________________________________ 
________________________________________________________________________ Chandni Mckinley MD  
 
Procedures: see electronic medical records for all procedures/Xrays and details which were not copied into this note but were reviewed prior to creation of Plan. LABS: 
I reviewed today's most current labs and imaging studies. Pertinent labs include: 
Recent Labs  
  01/16/19 0230 01/15/19 
2038 01/15/19 
1548 WBC 12.2* 13.5* 16.6* HGB 11.2* 11.2* 10.9* HCT 33.8* 34.4* 33.3*  
 411* 357 Recent Labs  
  01/16/19 
0230 01/15/19 
2038 01/15/19 
1548   --  140  
K 4.0  --  3.5   --  108 CO2 18*  --  22 *  --  144* BUN 29*  --  28* CREA 0.90  --  0.88 CA 8.7  --  8.4* ALB 2.9*  --  2.9* TBILI 0.3  --  0.2 SGOT 107*  --  130* ALT 58  --  57 INR  --  1.1  --   
 
 
Signed: Gemma Lyons MD

## 2019-01-16 NOTE — CONSULTS
Palliative Medicine Consult Vince: [phone] ([id number]) Patient Name: Margarita Jimenez YOB: 1960 Date of Initial Consult: 1/15/2019 Reason for Consult: End Stage Disease Requesting Provider: Matt Diaz MD 
Primary Care Physician: Milagro Sanders MD 
 
 SUMMARY:  
Margarita Jimenez is a 66 y.o. with a past history of GERD, previous smoker, and HTN, who was admitted on 1/15/2019 from home with a diagnosis of NSTEMI with an elevated proBNP and elevated Troponin. She presented to the ED for sudden onset of shortness of breath with associated nonproductive cough. She reported that she had been coughing for a week, but today she had a coughing fit that lead to shortness of breath. Jan 8th, she had a CT scan done of her chest because of the persistent cough, and was to follow up with pulmonary because of concerning nodules found. Of note she has also had a 26lb weight loss over the past few month This admission, there is significant concern for metastatic carcinoma- with the findings in her lungs, lesions on her liver, and the significant weight loss. She is going to have a biopsy of her liver done at some point to determine treatment option From a cardiac standpoint, she developed a-fib with RVR this admission and was started on heparin. It is no longer though that she had a STEMI, but rather the elevated troponin was from supply/demand with afib/acute resp failure Possible Tocco Subu cardiomyopathy is also possible She is scheduled for a cath later this admission, based on the evaluation of her lung cancer Current medical issues leading to Palliative Medicine involvement include: symptom management (shortness of breath)  And goals of care in the setting of a new cancer diagnosis with extreme symptom burden PALLIATIVE DIAGNOSES:  
1. Shortness of breath 2. Anxiety 3. Caregiver strain 4. Goals of care discussion 5. Cachectic  PLAN:  
 1. Met with patient, was going to talk about palliative medicine and start ACP discussions, but upon arrival, patient was in acute respiratory distress 2. Ordered 5mg of Roxinol to help with the air hunger, patient already has IV Ativan 1mg ordered 3. Nursing is working to get patient on high-flow oxygen to see if this improves her distress 4. Was able to talk to her  at the bedside once patient was a little more comfortable. Introduced palliative medicine services, and our scope. 5. We had a brief but serious and sohail discussion about intubation and ventilator support- given that her respiratory status is so precarious at this point, I am very concerned that she is going to wear herself out 6. I explained to her  (she was sleeping at this point) that we can control her symptoms up to a point with non-invasive measures such as medication and high flow oxygen, but there was a good chance that this may not be enough to keep her breathing. I also let him know that with her frail state, her COPD, and the concern that she has cancer- there was a very real possibility that we would be unable to wean her off of mechanical ventilator support. 7. He was unable to give me an answer during our discussion, but did seem distressed at the possibility of her being on a vent 8. I let him know that if he was here, then the team would involve him in decision making should that scenario happen, but if he was not here and it was an emergency, they will intubate her and call him afterwards, unless he lets us know before hand that this is not something she would want 9. Will follow up tomorrow to continue these discussions, hopefully with her involvement if she is in a little less distress. If she does well this admission, would recommend follow up with our OP clinic if patient is willing 10.  Of note, I did not get the impression from her  that he is accepting the diagnosis of cancer yet- as he was telling people on the phone \"we do not know what is wrong with her yet, they are still running tests\" 11. Initial consult note routed to primary continuity provider 12. Communicated plan of care with: Palliative IDT 
 
 
 GOALS OF CARE / TREATMENT PREFERENCES:  
 
GOALS OF CARE: 
Patient/Health Care Proxy Stated Goals: (unknown at this point) TREATMENT PREFERENCES:  
Code Status: Full Code Advance Care Planning: 
[x] The [facility] Interdisciplinary Team has updated the ACP Navigator with [address] and Patient Capacity Primary Decision Maker (Health Care Agent): Mat Elizabeth Relationship to patient:  Phone number: [phone] [] Named in a scanned document  
[x] Legal Next of Kin 
[] Guardian Secondary Decision Maker (First Alternate Health Care Agent):  
Relationship to patient: 
Phone number: 
[] Named in a scanned document  
[] Legal Next of Kin 
[] Guardian Other Instructions: Other: As far as possible, the palliative care team has discussed with patient / health care proxy about goals of care / treatment preferences for patient. HISTORY:  
 
History obtained from: chart, RN 
 
CHIEF COMPLAINT: patient unable to talk much, was very short of breath HPI/SUBJECTIVE: The patient is:  
[] Verbal and participatory [x] Non-participatory due to: shortness of breath Patietn in bed, bed at 90 degree angle and she was still unable to get her breathing under control Working very hard to breathe, eyes closed Echo tech at bedside performing test 
After 5mg of Roxinol and 1mg IV Ativan, patient was able to slow breathing a bit, and was resting, although still having retractions Respiratory therapy was on their way to set up high flow oxygen Clinical Pain Assessment (nonverbal scale for severity on nonverbal patients):  
Clinical Pain Assessment Severity: 0 Duration: for how long has pt been experiencing pain (e.g., 2 days, 1 month, years) Frequency: how often pain is an issue (e.g., several times per day, once every few days, constant) FUNCTIONAL ASSESSMENT:  
 
Palliative Performance Scale (PPS): PPS: 30 
 
 
 PSYCHOSOCIAL/SPIRITUAL SCREENING:  
 
Palliative IDT has assessed this patient for cultural preferences / practices and a referral made as appropriate to needs (Cultural Services, Patient Advocacy, Ethics, etc.) Any spiritual / Oriental orthodox concerns: 
[] Yes /  [] No 
 
Caregiver Burnout: 
[] Yes /  [x] No /  [] No Caregiver Present Anticipatory grief assessment:  
[] Normal  / [] Maladaptive ESAS Anxiety: Anxiety: 7 ESAS Depression:    
 
 
 REVIEW OF SYSTEMS:  
 
Positive and pertinent negative findings in ROS are noted above in HPI. The following systems were [x] reviewed / [] unable to be reviewed as noted in HPI Other findings are noted below. Systems: constitutional, ears/nose/mouth/throat, respiratory, gastrointestinal, genitourinary, musculoskeletal, integumentary, neurologic, psychiatric, endocrine. Positive findings noted below. Modified ESAS Completed by: provider Fatigue: 6 Pain: 0 Anxiety: 7 Dyspnea: 10 PHYSICAL EXAM:  
 
From RN flowsheet: 
Wt Readings from Last 3 Encounters:  
01/15/19 103 lb (46.7 kg) 08/08/18 115 lb 14.4 oz (52.6 kg) 08/16/17 119 lb (54 kg) Blood pressure (!) 89/52, pulse 99, temperature 96.4 °F (35.8 °C), resp. rate 28, height 5' 6\" (1.676 m), weight 103 lb (46.7 kg), SpO2 96 %, not currently breastfeeding. Pain Scale 1: Numeric (0 - 10) Pain Intensity 1: 0 Last bowel movement, if known:  
 
Constitutional: frail, appears older than stated age, denies pain, in acute distress from extreme shortness of breath Cardiovascular: tachycardic Respiratory: labored breathing, retractions, distress, on 6LPM nasal canula Skin: warm, dry Neurologic: fatigued, able to communicate some, but generally focused on breathing Other: 
 
 
 HISTORY:  
 
Principal Problem: 
  SOB (shortness of breath) (1/15/2019) Active Problems: 
  Elevated troponin (1/16/2019) Atrial fibrillation with RVR ([facility] [address] (1/16/2019) Past Medical History:  
Diagnosis Date  Atrial fibrillation with RVR ([facility] [address] 1/16/2019  Dizziness and giddiness  Elevated troponin 1/16/2019  GERD (gastroesophageal reflux disease)  Tobacco use disorder History reviewed. No pertinent surgical history. History reviewed. No pertinent family history. History reviewed, no pertinent family history. Social History Tobacco Use  Smoking status: Former Smoker Packs/day: 1.50 Years: 20.00 Pack years: 30.00 Types: Cigarettes  Smokeless tobacco: Never Used  Tobacco comment: 2-3 CIAGARETTES DAILY OR SOMETIMES Substance Use Topics  Alcohol use: No  
  Alcohol/week: 0.0 oz No Known Allergies Current Facility-Administered Medications Medication Dose Route Frequency  guaiFENesin ER (MUCINEX) tablet 600 mg  600 mg Oral BID  fluticasone-vilanterol (BREO ELLIPTA) 100mcg-25mcg/puff  1 Puff Inhalation DAILY  methylPREDNISolone (PF) (SOLU-MEDROL) injection 40 mg  40 mg IntraVENous DAILY  levalbuterol (XOPENEX) nebulizer soln 0.63 mg/3 mL  0.63 mg Nebulization NOW  ipratropium (ATROVENT) 0.02 % nebulizer solution 0.5 mg  0.5 mg Nebulization NOW  
 LORazepam (ATIVAN) injection 1 mg  1 mg IntraVENous Q4H PRN  
 morphine (ROXANOL) concentrated oral syringe 5 mg  5 mg Oral Q3H PRN  
 carvedilol (COREG) tablet 3.125 mg  3.125 mg Oral BID WITH MEALS  sodium chloride (NS) flush 5-40 mL  5-40 mL IntraVENous Q8H  
 sodium chloride (NS) flush 5-40 mL  5-40 mL IntraVENous PRN  
 levoFLOXacin (LEVAQUIN) 750 mg in D5W IVPB  750 mg IntraVENous Q24H  
 guaiFENesin (ROBITUSSIN) 100 mg/5 mL oral liquid 100 mg  100 mg Oral Q4H PRN  
  levalbuterol (XOPENEX) nebulizer soln 1.25 mg/3 mL  1.25 mg Nebulization Q4H RT  
 heparin 25,000 units in D5W 250 ml infusion  12-25 Units/kg/hr IntraVENous TITRATE  ipratropium (ATROVENT) 0.02 % nebulizer solution 0.5 mg  0.5 mg Nebulization Q4H RT  
 aspirin chewable tablet 81 mg  81 mg Oral DAILY  heparin (porcine) injection 1,400 Units  30 Units/kg IntraVENous PRN Or  
 heparin (porcine) injection 2,800 Units  60 Units/kg IntraVENous PRN  
 
 
 
 LAB AND IMAGING FINDINGS:  
 
Lab Results Component Value Date/Time WBC 12.2 (H) 01/16/2019 02:30 AM  
 HGB 11.2 (L) 01/16/2019 02:30 AM  
 PLATELET 807 90/59/5618 02:30 AM  
 
Lab Results Component Value Date/Time Sodium 138 01/16/2019 02:30 AM  
 Potassium 4.0 01/16/2019 02:30 AM  
 Chloride 107 01/16/2019 02:30 AM  
 CO2 18 (L) 01/16/2019 02:30 AM  
 BUN 29 (H) 01/16/2019 02:30 AM  
 Creatinine 0.90 01/16/2019 02:30 AM  
 Calcium 8.7 01/16/2019 02:30 AM  
  
Lab Results Component Value Date/Time AST (SGOT) 107 (H) 01/16/2019 02:30 AM  
 Alk. phosphatase 151 (H) 01/16/2019 02:30 AM  
 Protein, total 7.6 01/16/2019 02:30 AM  
 Albumin 2.9 (L) 01/16/2019 02:30 AM  
 Globulin 4.7 (H) 01/16/2019 02:30 AM  
 
Lab Results Component Value Date/Time INR 1.1 01/15/2019 08:38 PM  
 Prothrombin time 11.6 (H) 01/15/2019 08:38 PM  
 aPTT 40.6 (H) 01/16/2019 10:34 AM  
  
No results found for: IRON, FE, TIBC, IBCT, PSAT, FERR No results found for: PH, PCO2, PO2 No components found for: [place] Lab Results Component Value Date/Time  01/15/2019 03:48 PM  
  
 
 
   
 
Total time:  
Counseling / coordination time, spent as noted above:  
> 50% counseling / coordination?:  
 
Prolonged service was provided for  []30 min   []75 min in face to face time in the presence of the patient, spent as noted above. Time Start:  
Time End:  
Note: this can only be billed with 45382 (initial) or 30661 (follow up). If multiple start / stop times, list each separately.

## 2019-01-16 NOTE — ED NOTES
TRANSFER - OUT REPORT: 
 
Verbal report given to Bernard Rodríguez on Pansy Lapping  being transferred to [facility] for routine progression of care Report consisted of patients Situation, Background, Assessment and  
Recommendations(SBAR). Information from the following report(s) SBAR, ED Summary and Procedure Summary was reviewed with the receiving nurse. Lines:  
Peripheral IV 01/15/19 Left Antecubital (Active) Site Assessment Clean, dry, & intact 1/15/2019  2:16 PM  
Phlebitis Assessment 0 1/15/2019  2:16 PM  
Infiltration Assessment 0 1/15/2019  2:16 PM  
Dressing Status Clean, dry, & intact 1/15/2019  2:16 PM  
  
 
Opportunity for questions and clarification was provided. Patient transported with: 
Registered Nurse

## 2019-01-16 NOTE — PROGRESS NOTES
0700: Bedside shift change report given to Carlito Perez RN (oncoming nurse) by Jayde Trujillo RN (offgoing nurse). Report included the following information SBAR, Kardex, ED Summary, Procedure Summary, Intake/Output, MAR and Recent Results. 0730: Pt appears to be in acute respiratory distress -- RT paged for neb treatment, RRT RN and MD paged for new orders 0805: Verbal orderes received for stat EKG -- orders placed 1203: aPTT: 40.6, rate change confirmed with pharmacy -- rate changem documented in [facility] 
 
1835: Pt has not voided since start of shift  -- bladder scan showed pt retaining 450 mL. MD notified for further orders Problem: Falls - Risk of 
Goal: *Absence of Falls Document Calvin García Fall Risk and appropriate interventions in the flowsheet. Outcome: Progressing Towards Goal 
Fall Risk Interventions:

## 2019-01-16 NOTE — CONSULTS
[facility] Pulmonary, Critical Care, and Sleep Medicine Initial Patient Consult Name: Author Ramos MRN: [id number] : 1960 Hospital: [facility] Date: 2019 IMPRESSION:  
· Acute respiratory failure · Metastatic cancer · Mediastinal STAN 
· Liver mets · COPD exacerbation · Emphysema · Elevated troponin RECOMMENDATIONS:  
· Wean O2 · Jet nebs · IV steroids · Empiric abx · Heparin drip · Needs tissue dx, liver mass bx easiest  
· Needs palliative care consult · Dismal long term prognosis · Nothing more to add · Will sign off Subjective: This patient has been seen and evaluated at the request of Dr. Jamee Hart for abnormal chest ct. Patient is a 66 y.o. female smoker presented with sob, wheezing Found to have a very abnormal chest ct c/w metastatic disease, also elevated troponin, started on heparin drip Also started on jet nebs, abx, steroids for copd exacerbation Today pt undergoing abdominal US No acute distress, no acute complaints Still congested Past Medical History:  
Diagnosis Date  Dizziness and giddiness  GERD (gastroesophageal reflux disease)  Tobacco use disorder History reviewed. No pertinent surgical history. Prior to Admission medications Medication Sig Start Date End Date Taking? Authorizing Provider  
carvedilol (COREG) 3.125 mg tablet take 1 tablet by mouth twice a day 18  Yes Ann Morris MD  
losartan-hydroCHLOROthiazide [facility]) 100-12.5 mg per tablet take 1 tablet by mouth daily 18  Yes Ann Morris MD  
UBIDECARENONE (COENZYME Q10) 100 mg Tab Take 200 mg by mouth. Yes Provider, Historical  
MULTIVIT WITH CALCIUM,IRON,MIN (ONE-A-DAY WOMENS FORMULA PO) Take  by mouth. Yes Provider, Historical  
ibuprofen (ADVIL) 200 mg tablet Take  by mouth every six (6) hours as needed. Provider, Historical  
 
No Known Allergies Social History Tobacco Use  Smoking status: Former Smoker Packs/day: 1.50 Years: 20.00 Pack years: 30.00 Types: Cigarettes  Smokeless tobacco: Never Used  Tobacco comment: 2-3 CIAGARETTES DAILY OR SOMETIMES Substance Use Topics  Alcohol use: No  
  Alcohol/week: 0.0 oz History reviewed. No pertinent family history. Current Facility-Administered Medications Medication Dose Route Frequency  guaiFENesin ER (MUCINEX) tablet 600 mg  600 mg Oral BID  fluticasone-vilanterol (BREO ELLIPTA) 100mcg-25mcg/puff  1 Puff Inhalation DAILY  methylPREDNISolone (PF) (SOLU-MEDROL) injection 40 mg  40 mg IntraVENous DAILY  levalbuterol (XOPENEX) nebulizer soln 0.63 mg/3 mL  0.63 mg Nebulization NOW  ipratropium (ATROVENT) 0.02 % nebulizer solution 0.5 mg  0.5 mg Nebulization NOW  carvedilol (COREG) tablet 3.125 mg  3.125 mg Oral BID WITH MEALS  sodium chloride (NS) flush 5-40 mL  5-40 mL IntraVENous Q8H  
 levoFLOXacin (LEVAQUIN) 750 mg in D5W IVPB  750 mg IntraVENous Q24H  
 levalbuterol (XOPENEX) nebulizer soln 1.25 mg/3 mL  1.25 mg Nebulization Q4H RT  
 heparin 25,000 units in D5W 250 ml infusion  12-25 Units/kg/hr IntraVENous TITRATE  ipratropium (ATROVENT) 0.02 % nebulizer solution 0.5 mg  0.5 mg Nebulization Q4H RT  
 aspirin chewable tablet 81 mg  81 mg Oral DAILY Review of Systems: A comprehensive review of systems was negative except for: Respiratory: positive for cough, sputum, wheezing or dyspnea on exertion Objective:  
Vital Signs:   
Visit Vitals BP (!) 80/61 (BP 1 Location: Left arm, BP Patient Position: Sitting) Comment: notified rn  
Pulse 99 Temp 96.4 °F (35.8 °C) Resp 28 Ht 5' 6\" (1.676 m) Wt 46.7 kg (103 lb) SpO2 96% Breastfeeding? No  
BMI 16.62 kg/m² O2 Device: Nasal cannula O2 Flow Rate (L/min): 2.5 l/min Temp (24hrs), Av.6 °F (36.4 °C), Min:96.4 °F (35.8 °C), Max:98.7 °F (37.1 °C) Intake/Output: Last shift:      No intake/output data recorded. Last 3 shifts: No intake/output data recorded. Intake/Output Summary (Last 24 hours) at 1/16/2019 1042 Last data filed at 1/16/2019 6026 Gross per 24 hour Intake 0 ml Output  Net 0 ml Physical Exam:  
General:  Alert, cooperative, no distress, appears stated age. Head:  Normocephalic, without obvious abnormality, atraumatic. Eyes:  Conjunctivae/corneas clear. PERRL, EOMs intact. Nose: Nares normal. Septum midline. Mucosa normal. No drainage or sinus tenderness. Throat: Lips, mucosa, and tongue normal. Teeth and gums normal.  
Neck: Supple, symmetrical, trachea midline, no adenopathy, thyroid: no enlargment/tenderness/nodules, no carotid bruit and no JVD. Back:   Symmetric, no curvature. ROM normal.  
Lungs:   Decreased BS. Chest wall:  No tenderness or deformity. Heart:  Regular rate and rhythm, S1, S2 normal, no murmur, click, rub or gallop. Abdomen:   Soft, non-tender. Bowel sounds normal. No masses,  No organomegaly. Extremities: Extremities normal, atraumatic, no cyanosis or edema. Pulses: 2+ and symmetric all extremities. Skin: Skin color, texture, turgor normal. No rashes or lesions Lymph nodes: Cervical, supraclavicular, and axillary nodes normal.  
Neurologic: Grossly nonfocal  
 
Data review:  
 
Recent Results (from the past 24 hour(s)) CBC WITH AUTOMATED DIFF Collection Time: 01/15/19  3:48 PM  
Result Value Ref Range WBC 16.6 (H) 3.6 - 11.0 K/uL  
 RBC 3.72 (L) 3.80 - 5.20 M/uL  
 HGB 10.9 (L) 11.5 - 16.0 g/dL HCT 33.3 (L) 35.0 - 47.0 % MCV 89.5 80.0 - 99.0 FL  
 MCH 29.3 26.0 - 34.0 PG  
 MCHC 32.7 30.0 - 36.5 g/dL  
 RDW 13.4 11.5 - 14.5 % PLATELET 955 142 - 586 K/uL MPV 10.2 8.9 - 12.9 FL  
 NRBC 0.0 0  WBC ABSOLUTE NRBC 0.00 0.00 - 0.01 K/uL NEUTROPHILS 95 (H) 32 - 75 % LYMPHOCYTES 3 (L) 12 - 49 % MONOCYTES 1 (L) 5 - 13 % EOSINOPHILS 0 0 - 7 % BASOPHILS 0 0 - 1 % IMMATURE GRANULOCYTES 1 (H) 0.0 - 0.5 % ABS. NEUTROPHILS 15.7 (H) 1.8 - 8.0 K/UL  
 ABS. LYMPHOCYTES 0.5 (L) 0.8 - 3.5 K/UL  
 ABS. MONOCYTES 0.2 0.0 - 1.0 K/UL  
 ABS. EOSINOPHILS 0.0 0.0 - 0.4 K/UL  
 ABS. BASOPHILS 0.0 0.0 - 0.1 K/UL  
 ABS. IMM. GRANS. 0.2 (H) 0.00 - 0.04 K/UL  
 DF AUTOMATED    
 RBC COMMENTS NORMOCYTIC, NORMOCHROMIC METABOLIC PANEL, COMPREHENSIVE Collection Time: 01/15/19  3:48 PM  
Result Value Ref Range Sodium 140 136 - 145 mmol/L Potassium 3.5 3.5 - 5.1 mmol/L Chloride 108 97 - 108 mmol/L  
 CO2 22 21 - 32 mmol/L Anion gap 10 5 - 15 mmol/L Glucose 144 (H) 65 - 100 mg/dL BUN 28 (H) 6 - 20 MG/DL Creatinine 0.88 0.55 - 1.02 MG/DL  
 BUN/Creatinine ratio 32 (H) 12 - 20 GFR est AA >60 >60 ml/min/1.73m2 GFR est non-AA >60 >60 ml/min/1.73m2 Calcium 8.4 (L) 8.5 - 10.1 MG/DL Bilirubin, total 0.2 0.2 - 1.0 MG/DL  
 ALT (SGPT) 57 12 - 78 U/L  
 AST (SGOT) 130 (H) 15 - 37 U/L Alk. phosphatase 159 (H) 45 - 117 U/L Protein, total 7.4 6.4 - 8.2 g/dL Albumin 2.9 (L) 3.5 - 5.0 g/dL Globulin 4.5 (H) 2.0 - 4.0 g/dL A-G Ratio 0.6 (L) 1.1 - 2.2 CK Collection Time: 01/15/19  3:48 PM  
Result Value Ref Range  26 - 192 U/L  
TROPONIN I Collection Time: 01/15/19  3:48 PM  
Result Value Ref Range Troponin-I, Qt. 1.76 (H) <0.05 ng/mL NT-PRO BNP Collection Time: 01/15/19  3:48 PM  
Result Value Ref Range NT pro- (H) 0 - 125 PG/ML  
EKG, 12 LEAD, INITIAL Collection Time: 01/15/19  3:50 PM  
Result Value Ref Range Ventricular Rate 118 BPM  
 Atrial Rate 118 BPM  
 P-R Interval 138 ms QRS Duration 72 ms Q-T Interval 346 ms  
 QTC Calculation (Bezet) 484 ms Calculated P Axis 90 degrees Calculated R Axis 18 degrees Calculated T Axis 90 degrees Diagnosis Sinus tachycardia Poor R wave progression No previous ECGs available Confirmed by Augusta Mascorro ([id number]) on 1/16/2019 9:07:22 AM 
  
 TROPONIN I Collection Time: 01/15/19  7:01 PM  
Result Value Ref Range Troponin-I, Qt. 5.19 (H) <0.05 ng/mL TSH 3RD GENERATION Collection Time: 01/15/19  8:38 PM  
Result Value Ref Range TSH 2.01 0.36 - 3.74 uIU/mL PTT Collection Time: 01/15/19  8:38 PM  
Result Value Ref Range aPTT 26.1 22.1 - 32.0 sec  
 aPTT, therapeutic range     58.0 - 77.0 SECS  
CBC WITH AUTOMATED DIFF Collection Time: 01/15/19  8:38 PM  
Result Value Ref Range WBC 13.5 (H) 3.6 - 11.0 K/uL  
 RBC 3.84 3.80 - 5.20 M/uL  
 HGB 11.2 (L) 11.5 - 16.0 g/dL HCT 34.4 (L) 35.0 - 47.0 % MCV 89.6 80.0 - 99.0 FL  
 MCH 29.2 26.0 - 34.0 PG  
 MCHC 32.6 30.0 - 36.5 g/dL  
 RDW 13.5 11.5 - 14.5 % PLATELET 910 (H) 367 - 400 K/uL MPV 9.9 8.9 - 12.9 FL  
 NRBC 0.0 0  WBC ABSOLUTE NRBC 0.00 0.00 - 0.01 K/uL NEUTROPHILS 90 (H) 32 - 75 % LYMPHOCYTES 5 (L) 12 - 49 % MONOCYTES 4 (L) 5 - 13 % EOSINOPHILS 0 0 - 7 % BASOPHILS 0 0 - 1 % IMMATURE GRANULOCYTES 1 (H) 0.0 - 0.5 % ABS. NEUTROPHILS 12.2 (H) 1.8 - 8.0 K/UL  
 ABS. LYMPHOCYTES 0.7 (L) 0.8 - 3.5 K/UL  
 ABS. MONOCYTES 0.5 0.0 - 1.0 K/UL  
 ABS. EOSINOPHILS 0.0 0.0 - 0.4 K/UL  
 ABS. BASOPHILS 0.0 0.0 - 0.1 K/UL  
 ABS. IMM. GRANS. 0.1 (H) 0.00 - 0.04 K/UL  
 DF AUTOMATED    
 RBC COMMENTS NORMOCYTIC, NORMOCHROMIC    
TROPONIN I Collection Time: 01/15/19  8:38 PM  
Result Value Ref Range Troponin-I, Qt. 6.49 (H) <0.05 ng/mL PROTHROMBIN TIME + INR Collection Time: 01/15/19  8:38 PM  
Result Value Ref Range INR 1.1 0.9 - 1.1 Prothrombin time 11.6 (H) 9.0 - 11.1 sec METABOLIC PANEL, BASIC Collection Time: 01/16/19  2:30 AM  
Result Value Ref Range Sodium 138 136 - 145 mmol/L Potassium 4.0 3.5 - 5.1 mmol/L Chloride 107 97 - 108 mmol/L  
 CO2 18 (L) 21 - 32 mmol/L Anion gap 13 5 - 15 mmol/L Glucose 168 (H) 65 - 100 mg/dL BUN 29 (H) 6 - 20 MG/DL  Creatinine 0.90 0.55 - 1.02 MG/DL  
 BUN/Creatinine ratio 32 (H) 12 - 20 GFR est AA >60 >60 ml/min/1.73m2 GFR est non-AA >60 >60 ml/min/1.73m2 Calcium 8.7 8.5 - 10.1 MG/DL  
CBC WITH AUTOMATED DIFF Collection Time: 01/16/19  2:30 AM  
Result Value Ref Range WBC 12.2 (H) 3.6 - 11.0 K/uL  
 RBC 3.79 (L) 3.80 - 5.20 M/uL  
 HGB 11.2 (L) 11.5 - 16.0 g/dL HCT 33.8 (L) 35.0 - 47.0 % MCV 89.2 80.0 - 99.0 FL  
 MCH 29.6 26.0 - 34.0 PG  
 MCHC 33.1 30.0 - 36.5 g/dL  
 RDW 13.6 11.5 - 14.5 % PLATELET 889 998 - 748 K/uL MPV 10.2 8.9 - 12.9 FL  
 NRBC 0.0 0  WBC ABSOLUTE NRBC 0.00 0.00 - 0.01 K/uL NEUTROPHILS 90 (H) 32 - 75 % LYMPHOCYTES 6 (L) 12 - 49 % MONOCYTES 4 (L) 5 - 13 % EOSINOPHILS 0 0 - 7 % BASOPHILS 0 0 - 1 % IMMATURE GRANULOCYTES 1 (H) 0.0 - 0.5 % ABS. NEUTROPHILS 10.9 (H) 1.8 - 8.0 K/UL  
 ABS. LYMPHOCYTES 0.8 0.8 - 3.5 K/UL  
 ABS. MONOCYTES 0.5 0.0 - 1.0 K/UL  
 ABS. EOSINOPHILS 0.0 0.0 - 0.4 K/UL  
 ABS. BASOPHILS 0.0 0.0 - 0.1 K/UL  
 ABS. IMM. GRANS. 0.1 (H) 0.00 - 0.04 K/UL  
 DF AUTOMATED HEMOGLOBIN A1C WITH EAG Collection Time: 01/16/19  2:30 AM  
Result Value Ref Range Hemoglobin A1c 5.9 4.2 - 6.3 % Est. average glucose 123 mg/dL T4, FREE Collection Time: 01/16/19  2:30 AM  
Result Value Ref Range T4, Free 1.0 0.8 - 1.5 NG/DL  
HEPATIC FUNCTION PANEL Collection Time: 01/16/19  2:30 AM  
Result Value Ref Range Protein, total 7.6 6.4 - 8.2 g/dL Albumin 2.9 (L) 3.5 - 5.0 g/dL Globulin 4.7 (H) 2.0 - 4.0 g/dL A-G Ratio 0.6 (L) 1.1 - 2.2 Bilirubin, total 0.3 0.2 - 1.0 MG/DL Bilirubin, direct <0.1 0.0 - 0.2 MG/DL Alk. phosphatase 151 (H) 45 - 117 U/L  
 AST (SGOT) 107 (H) 15 - 37 U/L  
 ALT (SGPT) 58 12 - 78 U/L  
LIPID PANEL Collection Time: 01/16/19  2:30 AM  
Result Value Ref Range LIPID PROFILE Cholesterol, total 132 <200 MG/DL  Triglyceride 106 <150 MG/DL  
 HDL Cholesterol 63 MG/DL  
 LDL, calculated 47.8 0 - 100 MG/DL  
 VLDL, calculated 21.2 MG/DL  
 CHOL/HDL Ratio 2.1 0 - 5.0    
PTT Collection Time: 01/16/19  2:30 AM  
Result Value Ref Range aPTT 34.4 (H) 22.1 - 32.0 sec  
 aPTT, therapeutic range     58.0 - 77.0 SECS  
EKG, 12 LEAD, SUBSEQUENT Collection Time: 01/16/19  8:09 AM  
Result Value Ref Range Ventricular Rate 135 BPM  
 Atrial Rate 135 BPM  
 P-R Interval 134 ms QRS Duration 72 ms Q-T Interval 280 ms QTC Calculation (Bezet) 420 ms Calculated P Axis 89 degrees Calculated R Axis -43 degrees Calculated T Axis 100 degrees Diagnosis ** Poor data quality, interpretation may be adversely affected Sinus tachycardia Left atrial enlargement Left axis deviation Anteroseptal infarct , age undetermined When compared with ECG of 15-FIDENCIO-2019 15:50, 
MANUAL COMPARISON REQUIRED, DATA IS UNCONFIRMED Confirmed by Deejay Chong ([id number]) on 1/16/2019 9:12:36 AM 
  
 
 
Imaging: 
I have personally reviewed the patients radiographs and have reviewed the reports: CXR and chest ct reviewed Yoseph Vargas MD

## 2019-01-16 NOTE — PROGRESS NOTES
Initial Nutrition Assessment: 
 
INTERVENTIONS/RECOMMENDATIONS:  
· Regular diet when medically feasible · Ensure TID (chocolate) · If wanting a kcal count, initiate once all testing is done so that pt will have an uninterrupted diet for 72 hrs ASSESSMENT:  
Chart reviewed, medically noted for lung cancer with liver mets, emphysema and PMH shown below. Nutrition consulted for general nutrition management and calorie count. Pt was present with family who report 20 lbs (16%) weight loss over the past 1-2 months. Weight history shows 12 lbs since last documented weight in August 2018. Pt reports trying to eat something 3x per day however is usually something small (1-2 eggs, soup, etc). Her appearance is cachetic with temporal, clavicle and scapular region muscle wasting as well as upper arm and thoracic region fat wasting. Pt meets ASPEN criteria for acute severe malnutrition. Pt agreed to try ensure shakes TID. Pt is currently NPO and unable to start kcal count at this time. I recommend not starting a kcal count until pt has several days of uninterrupted diet. If pt is NPO for a meal it will skew kcal count results. Meets Criteria for Acute Malnutrition  
[x] Severe Malnutrition, as evidenced by: 
 [x] Moderate muscle wasting, loss of subcutaneous fat 
 [x] Nutritional intake of <50% of recommended intake for >5 days [x] Weight loss of >1-2% in 1 week, >5% in 1 month, >7.5% in 3 months, or >10% in 6 months 
 [] Moderate-severe edema Past Medical History:  
Diagnosis Date  Atrial fibrillation with RVR ([address] 1/16/2019  Dizziness and giddiness  Elevated troponin 1/16/2019  GERD (gastroesophageal reflux disease)  Tobacco use disorder Diet Order: NPO 
% Eaten:   
Patient Vitals for the past 72 hrs: 
 % Diet Eaten 01/16/19 0911 0 % Pertinent Medications: [x]Reviewed: solumedrol, Pertinent Labs: [x]Reviewed:  
Food Allergies: [x]NKFA  []Other Last BM: 1/16 Edema:        []RUE   []LUE   []RLE   []LLE Pressure Injury:      [] Stage I   [] Stage II   [] Stage III   [] Stage IV Wt Readings from Last 30 Encounters:  
01/15/19 46.7 kg (103 lb) 08/08/18 52.6 kg (115 lb 14.4 oz) 08/16/17 54 kg (119 lb) 08/04/16 56.8 kg (125 lb 3.2 oz)  
07/30/15 56.2 kg (124 lb) 08/29/14 53.6 kg (118 lb 1.6 oz)  
07/17/13 51.4 kg (113 lb 6.4 oz)  
07/25/12 52.6 kg (115 lb 14.4 oz) Anthropometrics:  
Height: 5' 6\" (167.6 cm) Weight: 46.7 kg (103 lb) IBW (%IBW):   ( ) UBW (%UBW):   (  %) Last Weight Metrics: 
Weight Loss Metrics 1/15/2019 8/8/2018 8/16/2017 8/4/2016 7/30/2015 8/29/2014 7/17/2013 Today's Wt 103 lb 115 lb 14.4 oz 119 lb 125 lb 3.2 oz 124 lb 118 lb 1.6 oz 113 lb 6.4 oz BMI 16.62 kg/m2 18.71 kg/m2 19.21 kg/m2 20.22 kg/m2 20.02 kg/m2 19.07 kg/m2 18.31 kg/m2 BMI: Body mass index is 16.62 kg/m². This BMI is indicative of: 
 [x]Underweight    []Normal    []Overweight    [] Obesity   [] Extreme Obesity (BMI>40) Estimated Nutrition Needs (Based on):  
1600 Kcals/day(BMR: 1075 x 1.5) , 60 g(1.3 g/kg) Protein Carbohydrate: At Least 130 g/day  Fluids: 1600 mL/day (1ml/kcal) or per primary team 
 
NUTRITION DIAGNOSES:  
Problem:  Inadequate protein-energy intake Etiology: related to poor appetite, altered taste and possible hypermetabolic state Signs/Symptoms: as evidenced by 20 lbs (16%) weight loss x 2 months NUTRITION INTERVENTIONS: 
Meals/Snacks: General/healthful diet   Supplements: Commercial supplement GOAL:  
consume >50% of meals and 75% of ONS in 2-4 days LEARNING NEEDS (Diet, Food/Nutrient-Drug Interaction):  
 [x] None Identified 
 [] Identified and Education Provided/Documented 
 [] Identified and Pt declined/was not appropriate Cultureal, Shinto, OR Ethnic Dietary Needs:  
 [x] None Identified 
 [] Identified and Addressed 
 
 [x] Interdisciplinary Care Plan Reviewed/Documented [x] Discharge Planning: Kcal and protein dense foods MONITORING /EVALUATION:  
  
Food/Nutrient Intake Outcomes: Total energy intake Physical Signs/Symptoms Outcomes: Weight/weight change, Electrolyte and renal profile, GI 
 
NUTRITION RISK:  
 [x] High              [] Moderate           []  Low  []  Minimal/Uncompromised PT SEEN FOR:  
 [x]  MD Consult: []Calorie Count []Diabetic Diet Education []Diet Education []Electrolyte Management 
   [x]General Nutrition Management and Supplements []Management of Tube Feeding []TPN Recommendations []  RN Referral:  []MST score >=2 
   []Enteral/Parenteral Nutrition PTA []Pregnant: Gestational DM or Multigestation 
   []Pressure Ulcer/Wound Care needs [x]  Low BMI 
[]  LOS Referral  
 
 
Naeem Ramirez RDN Pager [phone] Weekend Pager [phone]

## 2019-01-16 NOTE — CONSULTS
[address] [place] [address] Jerie Barthel 
MR#: [id number] : 1960 ACCOUNT #: [de-identified] DATE OF SERVICE: 2019 REASON FOR CONSULTATION:  Possible metastatic cancer. REFERRING PHYSICIAN:  Dr. Camila Barroso. HISTORY OF PRESENT ILLNESS:  The patient is a 28-year-old white female who came to the emergency room with cough, shortness of breath. She had lost 26 pounds over the past couple of months. She had a CTA of her chest done down to the emergency room that showed no evidence of PE, but showed bulky mediastinal and bilateral hilar lymphadenopathy. The limited evaluation of the upper abdomen showed diffuse hepatic metastatic disease. The patient also was found to have elevated troponin. She was started on heparin drip for that and admitted for further evaluation. This morning, the patient continues to have some shortness of breath. We are asked to see her for further evaluation. PAST MEDICAL HISTORY:  GERD. SOCIAL HISTORY:  Former smoker. She quit about 30 years ago, but she had about a 20-pack-year history. She does not drink. FAMILY HISTORY:  No history of cancers. CURRENT MEDICATIONS:  She is on aspirin, carvedilol, Atrovent, Xopenex, Levaquin, Solu-Medrol, metoprolol, heparin drip. ALLERGIES:  NO KNOWN DRUG ALLERGIES. REVIEW OF SYSTEMS:  A 12-point review of systems done and negative except for as above. PHYSICAL EXAMINATION: 
VITAL SIGNS:  Temperature 96.4, pulse 99, blood pressure 89/52, respirations 28, satting 96% on 2.5 liters nasal cannula. GENERAL:  Sitting up in bed in no acute distress. HEENT:  Normocephalic, atraumatic. Oropharynx is clear without lesions. NECK:  Supple. No cervical, supraclavicular, or axillary lymphadenopathy appreciated. CARDIOVASCULAR:  Regular rate and rhythm. LUNGS:  Clear to auscultation bilaterally. ABDOMEN:  Normoactive bowel sounds, soft, nontender. EXTREMITIES:  No clubbing, cyanosis or edema. NEUROLOGIC:  Nonfocal. 
 
LABORATORY DATA:  White blood cell count 12.2, hemoglobin 11.2, platelets 556. Chemistry:  Sodium 138, potassium 4.0, BUN 29, creatinine 0.9, total bilirubin 0.3, , alkaline phosphatase 151. IMPRESSION AND PLAN:  The patient is a 61-year-old white female with what appears to be some type of metastatic carcinoma. The patient has been seen by Pulmonary. They believe the easiest way to get a biopsy is to do a biopsy of one of the liver lesion, that has been ordered. We will see what that shows. I talked with her and her  today. We need to see what we are dealing with before we can determine treatment options. She also is going to be evaluated by cardiology with her elevated troponin. She is on heparin drip for that. We will continue to follow along with you and make further recommendations as pathology becomes available. MD BRADFORD Morrow / MARY CARMEN 
D: 01/16/2019 11:13    
T: 01/16/2019 12:17 JOB #: [id number]

## 2019-01-16 NOTE — CDMP QUERY
Please clarify if this patient is (was) being treated/managed for:  
 
=> Severe protein-calorie malnutrition in the setting of poor po intake in 58F treated with Dietitian consult 
=> Other explanation of clinical findings 
=> Clinically Undetermined (no explanation for clinical findings) The medical record reflects the following clinical findings, treatment, and risk factors. Risk Factors: poor appetite;  altered taste; possible metastatic ca Clinical Indicators:  Dietitian noted pt meets the following ASPEN criteria: 20 lbs  (16lbs) weight loss x 2 months; her appearance is cachetic with temporal, clavicle and scapular region muscle wasting as well as upper arm and thoracic region fat wasting. Treatment: Dietitian consult Please clarify and document your clinical opinion in the progress notes and discharge summary including the definitive and/or presumptive diagnosis, (suspected or probable), related to the above clinical findings. Please include clinical findings supporting your diagnosis. Thank Everardo Price [facility] 
[phone]

## 2019-01-16 NOTE — PROGRESS NOTES
Spiritual Care Assessment/Progress Note [address] 
 
 
NAME: Mane Madera      MRN: [id number] AGE: 66 y.o. SEX: female Mosque Affiliation: No preference Language: [place] 1/16/2019     Total Time (in minutes): 15 Spiritual Assessment begun in [facility] CARDIOPULMONARY CARE through conversation with: 
  
    []Patient        [x] Family    [] Friend(s) Reason for Consult: Palliative Care, Initial/Spiritual Assessment, Request by staff Spiritual beliefs: (Please include comment if needed) [x] Identifies with a eber tradition:     
   [] Supported by a eber community:        
   [] Claims no spiritual orientation:       
   [] Seeking spiritual identity:            
   [] Adheres to an individual form of spirituality:       
   [] Not able to assess:                   
 
    
Identified resources for coping:  
   [x] Prayer                           
   [] Music                  [] Guided Imagery [x] Family/friends                 [] Pet visits [] Devotional reading                         [] Unknown 
   [] Other:                                        
 
 
Interventions offered during this visit: (See comments for more details) Family/Friend(s): Affirmation of emotions/emotional suffering, Iconic (affirming the presence of God/Higher Power), Prayer (assurance of) Plan of Care: 
 
 [] Support spiritual and/or cultural needs  
 [] Support AMD and/or advance care planning process    
 [] Support grieving process 
 [] Coordinate Rites and/or Rituals  
 [] Coordination with community clergy 
 [x] No spiritual needs identified at this time 
 [] Detailed Plan of Care below (See Comments)  [] Make referral to Music Therapy 
[] Make referral to Pet Therapy    
[] Make referral to Addiction services 
[] Make referral to [facility] 
[] Make referral to Spiritual Care Partner 
[] No future visits requested       
[x] Follow up visits as needed Comments: The patient was resting in bed. There were three other individuals present. Present was the younger brother of the patient, a female visitor, and the spouse of the patient (who was asleep in the chair at bedside). The brother of the patient stood to greet me and we had a conversation outside of the room. The brother expressed to me that the patient is having a difficult time and that the report from the doctor today bore bad news. The brother mentioned his trust in God. He shared that the patient had been doing well and that the spouse was the one they had the health concerns about. The family appeared to be in shock about the medical issues that have come to the forefront. I offered spiritual support to the brother. It is likely that the family will draw on spiritual resources available. Rev. Mitchael Goldmann, EdD MDiv Palliative  Fellow For [facility] Page [phone])

## 2019-01-16 NOTE — PROGRESS NOTES
TRANSFER - IN REPORT: 
 
Verbal report received from Jo Damon RN on Mane Madera  being received from ER for routine progression of care Report consisted of patients Situation, Background, Assessment and  
Recommendations(SBAR). Information from the following report(s) SBAR was reviewed with the receiving nurse. Opportunity for questions and clarification was provided. Assessment completed upon patients arrival to unit and care assumed. Primary Nurse Karlos Snyder RN and Kamila Bales RN performed a dual skin assessment on this patient No impairment noted Pop score is 23.

## 2019-01-16 NOTE — PROGRESS NOTES
Pharmacy  Heparin Monitoring Indication: DVT prophylaxis Current Dose: Heparin 5000 units subcutaneously every 8 hours Creatinine Clearance (mL/min): 66.8 ml/min Current Weight: 46.7 Kg Labs: 
Recent Labs  
  01/15/19 
1548 CREA 0.88 HGB 10.9*  Wt Readings from Last 1 Encounters:  
01/15/19 46.7 kg (103 lb) Ht Readings from Last 1 Encounters:  
01/15/19 167.6 cm (66\") Impression/Plan:  
· Change to heparin 5000 units subcutaneously every 12hr per protocol for low weight patients <60 kg Thanks, Sushant Robert, PHARMD

## 2019-01-16 NOTE — PROGRESS NOTES
ADULT PROTOCOL: JET AEROSOL  REASSESSMENT Patient  Diana Mullins     66 y.o.   female     1/16/2019  2:38 PM 
 
Breath Sounds Pre Procedure: Right Breath Sounds: Coarse, Expiratory wheezing Left Breath Sounds: Coarse, Expiratory wheezing Breath Sounds Post Procedure: Right Breath Sounds: Coarse, Wheezing Left Breath Sounds: Coarse, Wheezing Breathing pattern: Pre procedure Breathing Pattern: Dyspnea at rest 
        Post procedure Breathing Pattern: Labored, Tachypneic(not as labored) Heart Rate: Pre procedure Pulse: 110 Post procedure Pulse: 132 Resp Rate: Pre procedure Respirations: 24 
         Post procedure Respirations: 28 
 
     
 
Cough: Pre procedure Cough: Non-productive Post procedure Cough: Non-productive Oxygen: O2 Device: Nasal cannula   Flow rate (L/min) 3 lpm 
   Changed: YES/ MD requested HFNC SpO2: Pre procedure SpO2: 96 %   with oxygen Post procedure SpO2: 98 %  with oxygen Nebulizer Therapy: Current medications Aerosolized Medications: Ipratropium bromide Changed: YES/ MD changed to xopenex and atrovent Smoking History: former smoker Problem List:  
Patient Active Problem List  
Diagnosis Code  Essential hypertension, benign I10  
 Dizziness and giddiness R42  Tobacco use disorder F17.200  Generalized anxiety disorder F41.1  SOB (shortness of breath) R06.02  
 Elevated troponin R74.8  Atrial fibrillation with RVR (HCC) I48.91 Respiratory Therapist: Marlene Greenwood RT

## 2019-01-17 NOTE — PROGRESS NOTES
Problem: Falls - Risk of 
Goal: *Absence of Falls Document Calvin García Fall Risk and appropriate interventions in the flowsheet. Outcome: Progressing Towards Goal 
Fall Risk Interventions: 
Mobility Interventions: Bed/chair exit alarm, Utilize walker, cane, or other assistive device, Utilize gait belt for transfers/ambulation, Patient to call before getting OOB Medication Interventions: Bed/chair exit alarm, Patient to call before getting OOB, Teach patient to arise slowly Elimination Interventions: Patient to call for help with toileting needs, Call light in reach, Bed/chair exit alarm, Toileting schedule/hourly rounds

## 2019-01-17 NOTE — PROGRESS NOTES
Problem: Dysphagia (Adult) Goal: *Acute Goals and Plan of Care (Insert Text) Initiated 1/17/2019 1. Patient will tolerate full liquid diet free of s/s of aspiration within 7 days. Speech LAnguage Pathology bedside swallow evaluation Patient: Cem Rascon (33 y.o. female) Date: 1/17/2019 Primary Diagnosis: SOB (shortness of breath) Precautions: fall ASSESSMENT : 
Based on the objective data described below, the patient presents with suspected mild to moderate pharyngeal dysphagia based on bedside assessment. She is at risk for aspiration, though her biggest aspiration risk factor appears to be her respiratory status. She is currently on 8 L HFNC, with increased work of breathing at rest and with her head of bed elevated. With self-feeding, patient is very careful, taking very small sips and swallowing 3-4 times per bolus. She limits intake due to fatigue and work of breathing. Educated patient regarding risk for aspiration when respiratory status is complicated. She verbalized understanding. Solids deferred for safety, and patient agreed. Cautiously recommend starting a diet when awake and alert and breathing comfortably. Likely intake will be limited by fatigue. Patient will benefit from skilled intervention to address the above impairments. Patients rehabilitation potential is considered to be Guarded Factors which may influence rehabilitation potential include:  
[]            None noted []            Mental ability/status [x]            Medical condition []            Home/family situation and support systems 
[]            Safety awareness 
[]            Pain tolerance/management []            Other: PLAN : 
Recommendations and Planned Interventions: 
Full liquids with caution Awake and alert and upright for all PO Hold PO for any decline in respiratory status Frequency/Duration: Patient will be followed by speech-language pathology 3 times a week to address goals. Discharge Recommendations: To Be Determined SUBJECTIVE:  
Patient stated I'm hungry. OBJECTIVE:  
 
Past Medical History:  
Diagnosis Date  Atrial fibrillation with RVR ([address] 1/16/2019  Dizziness and giddiness  Elevated troponin 1/16/2019  GERD (gastroesophageal reflux disease)  Tobacco use disorder History reviewed. No pertinent surgical history. Prior Level of Function/Home Situation:   
Home Situation Home Environment: Private residence One/Two Story Residence: One story Living Alone: No 
Support Systems: Family member(s), Spouse/Significant Other/Partner Patient Expects to be Discharged to[de-identified] Private residence Current DME Used/Available at Home: None Diet prior to admission: unknown Current Diet:  NPO Cognitive and Communication Status: 
Neurologic State: Alert Orientation Level: Oriented to person Cognition: Follows commands, Appropriate decision making Perception: Appears intact Perseveration: No perseveration noted Safety/Judgement: Awareness of environment, Good awareness of safety precautions Oral Assessment: 
Oral Assessment Labial: No impairment Dentition: Edentulous(has dentures but they are not in) Oral Hygiene: xerostomia Lingual: No impairment Velum: Unable to visualize Mandible: No impairment P.O. Trials: 
Patient Position: upright in bed Vocal quality prior to P.O.: Low volume Consistency Presented: Thin liquid;Puree How Presented: Self-fed/presented;Spoon;Straw Bolus Acceptance: No impairment Bolus Formation/Control: No impairment Propulsion: No impairment Oral Residue: None Initiation of Swallow: No impairment Laryngeal Elevation: Decreased Aspiration Signs/Symptoms: None Pharyngeal Phase Characteristics: Multiple swallows Oral Phase Severity: No impairment Pharyngeal Phase Severity : Mild-moderate NOMS:  
The NOMS functional outcome measure was used to quantify this patient's level of swallowing impairment. Based on the NOMS, the patient was determined to be at level 3 for swallow function NOMS Swallowing Levels: 
Level 1 (CN): NPO Level 2 (CM): NPO but takes consistency in therapy Level 3 (CL): Takes less than 50% of nutrition p.o. and continues with nonoral feedings; and/or safe with mod cues; and/or max diet restriction Level 4 (CK): Safe swallow but needs mod cues; and/or mod diet restriction; and/or still requires some nonoral feeding/supplements Level 5 (CJ): Safe swallow with min diet restriction; and/or needs min cues Level 6 (CI): Independent with p.o.; rare cues; usually self cues; may need to avoid some foods or needs extra time Level 7 (CH): Independent for all p.o. [place]. (2003). National Outcomes Measurement System (NOMS): Adult Speech-Language Pathology User's Guide. Pain: 
Pain Scale 1: Numeric (0 - 10) Pain Intensity 1: 0 After treatment:  
[]            Patient left in no apparent distress sitting up in chair 
[x]            Patient left in no apparent distress in bed 
[x]            Call bell left within reach [x]            Nursing notified 
[]            Caregiver present 
[]            Bed alarm activated COMMUNICATION/EDUCATION:  
The patients plan of care including recommendations, planned interventions, and recommended diet changes were discussed with:RN Patient was educated regarding aspiration risk factors, role of SLP, diet recommendations. []            Posted safety precautions in patient's room. []            Patient/family have participated as able in goal setting and plan of care. [x]            Patient/family agree to work toward stated goals and plan of care. []            Patient understands intent and goals of therapy, but is neutral about his/her participation. []            Patient is unable to participate in goal setting and plan of care.  
 
Thank you for this referral. 
Paul Gan, SLP 
 Time Calculation: 18 mins

## 2019-01-17 NOTE — PROGRESS NOTES
Problem: Falls - Risk of 
Goal: *Absence of Falls Document Javed Hunter Fall Risk and appropriate interventions in the flowsheet. Outcome: Progressing Towards Goal 
Fall Risk Interventions: 
Mobility Interventions: Bed/chair exit alarm, OT consult for ADLs, PT Consult for mobility concerns, Utilize walker, cane, or other assistive device Medication Interventions: Bed/chair exit alarm, Patient to call before getting OOB Elimination Interventions: Bed/chair exit alarm, Call light in reach, Patient to call for help with toileting needs

## 2019-01-17 NOTE — PROGRESS NOTES
Hematology Oncology Progress Note Follow up for: Possible Met Ca Chart notes reviewed since last visit. Case discussed with following:  
 
Patient complains of the following: Fatigue, some SOB Additional concerns noted by the staff:  
 
Patient Vitals for the past 24 hrs: 
 BP Temp Pulse Resp SpO2  
01/17/19 0744     99 % 01/17/19 0712 102/69 97.7 °F (36.5 °C) (!) 108 21 100 % 01/17/19 0310 101/67 97.9 °F (36.6 °C) (!) 116 22 100 % 01/17/19 0149     100 % 01/16/19 2230 112/87 97.9 °F (36.6 °C) (!) 120 28 100 % 01/16/19 2226     98 % 01/16/19 1938 (!) 88/59 98.2 °F (36.8 °C) (!) 107 22 100 % 01/16/19 1832 107/66  (!) 137    
01/16/19 1752 97/74  (!) 124    
01/16/19 1643 91/62  (!) 102 26 100 % 01/16/19 1538 92/61 97.6 °F (36.4 °C) (!) 110 22 100 % 01/16/19 1515     98 % 01/16/19 1130     96 % 01/16/19 1054 (!) 89/52  Vini Hughes Review of Systems: 
12 point ROS done and negative except as above Physical Examination: 
Gen NAD 
CV reg Lungs few rhonchi 
abd benign Labs: 
Recent Results (from the past 24 hour(s)) PTT Collection Time: 01/16/19 10:34 AM  
Result Value Ref Range aPTT 40.6 (H) 22.1 - 32.0 sec  
 aPTT, therapeutic range     58.0 - 77.0 SECS  
PTT Collection Time: 01/16/19  6:29 PM  
Result Value Ref Range aPTT 76.8 (H) 22.1 - 32.0 sec  
 aPTT, therapeutic range     58.0 - 77.0 SECS  
PTT Collection Time: 01/17/19 12:06 AM  
Result Value Ref Range aPTT 68.6 (H) 22.1 - 32.0 sec  
 aPTT, therapeutic range     58.0 - 77.0 SECS  
CBC WITH AUTOMATED DIFF Collection Time: 01/17/19 12:06 AM  
Result Value Ref Range WBC 16.3 (H) 3.6 - 11.0 K/uL  
 RBC 3.96 3.80 - 5.20 M/uL  
 HGB 11.4 (L) 11.5 - 16.0 g/dL HCT 34.8 (L) 35.0 - 47.0 % MCV 87.9 80.0 - 99.0 FL  
 MCH 28.8 26.0 - 34.0 PG  
 MCHC 32.8 30.0 - 36.5 g/dL  
 RDW 13.6 11.5 - 14.5 % PLATELET 166 (H) 170 - 400 K/uL  MPV 10.4 8.9 - 12.9 FL  
 NRBC 0.0 0  WBC ABSOLUTE NRBC 0.00 0.00 - 0.01 K/uL NEUTROPHILS 87 (H) 32 - 75 % LYMPHOCYTES 7 (L) 12 - 49 % MONOCYTES 6 5 - 13 % EOSINOPHILS 0 0 - 7 % BASOPHILS 0 0 - 1 % IMMATURE GRANULOCYTES 1 (H) 0.0 - 0.5 % ABS. NEUTROPHILS 14.1 (H) 1.8 - 8.0 K/UL  
 ABS. LYMPHOCYTES 1.1 0.8 - 3.5 K/UL  
 ABS. MONOCYTES 1.0 0.0 - 1.0 K/UL  
 ABS. EOSINOPHILS 0.0 0.0 - 0.4 K/UL  
 ABS. BASOPHILS 0.0 0.0 - 0.1 K/UL  
 ABS. IMM. GRANS. 0.1 (H) 0.00 - 0.04 K/UL  
 DF AUTOMATED METABOLIC PANEL, BASIC Collection Time: 01/17/19 12:06 AM  
Result Value Ref Range Sodium 136 136 - 145 mmol/L Potassium 3.9 3.5 - 5.1 mmol/L Chloride 104 97 - 108 mmol/L  
 CO2 22 21 - 32 mmol/L Anion gap 10 5 - 15 mmol/L Glucose 135 (H) 65 - 100 mg/dL BUN 42 (H) 6 - 20 MG/DL Creatinine 0.95 0.55 - 1.02 MG/DL  
 BUN/Creatinine ratio 44 (H) 12 - 20 GFR est AA >60 >60 ml/min/1.73m2 GFR est non-AA >60 >60 ml/min/1.73m2 Calcium 8.7 8.5 - 10.1 MG/DL MAGNESIUM Collection Time: 01/17/19 12:06 AM  
Result Value Ref Range Magnesium 2.9 (H) 1.6 - 2.4 mg/dL Assessment and Plan:  
Possible Met Ca 
-going for biopsy of liver lesion this am 
-will see what results reveal 
 
AFIB w/ RVR 
-on heparin drip 
-plan per cards

## 2019-01-17 NOTE — PROGRESS NOTES
Hospitalist Progress Note NAME: Case Mathews :  1960 MRN:  [id number] Assessment / Plan: 
Acute respiratory failure with hypoxia secondary to end-stage COPD with severe emphysema, metastatic cancer to liver, afib with RVR, possible NSTEMI End-stage COPD/severe emphysema Post-obstructive PNA Metastatic cancer NSTEMI with new diagnosis of systolic heart failure EF 25% 
afib with RVR  Troponin peaked at 6 CT chest revealed bulky mediastinal lymphadenopathy, severe emphysema, mild post-obstructive disease in the Left lung Still tachycardic and hypoxic requiring 6L high flow nasal cannula. Very poor prognosis given new diagnosis of metastatic cancer, end-stage COPD, and systolic heart failure. Discussed case with cardiology team. Plan is Liver biopsy tomorrow. Continue heparin drip, duonebs, levaquin, coreg, ASA, statin, mucinex, solumedrol. Oncology teams is following. Recommendation reviewed and agreed. Hypertension Low normal blood pressure. holding home losartan/HCTZ Monitor. Anxiety Goals of care Palliative care consulted to help with care decisions. Ativan IV prn Severe protein-calorie malnutrition in the setting of poor po intake in 58F treated with Dietitian consult 
prn supplements per diet  
  
Body mass index is 16.62 kg/m². therefore classifying patient as underweight 
  
I have personally reviewed the radiographs, laboratory data in Epic and decisions and statements above are based partially on this personal interpretation. 
  
Code Status: Full Code DVT Prophylaxis: Hep SQ 
GI Prophylaxis: not indicated Subjective: Chief Complaint / Reason for Physician Visit SOB Patient resting in a seated position. She is using her accessory muscles.  at beside, patient very short of breath, tired and with cough. She could not sleep overnight. Discussed with RN events overnight. Review of Systems: 
Symptom Y/N Comments  Symptom Y/N Comments Fever/Chills n   Chest Pain n   
Poor Appetite y   Edema n   
Cough y   Abdominal Pain n   
Sputum y   Joint Pain SOB/BENITEZ y   Pruritis/Rash Nausea/vomit n   Tolerating PT/OT Diarrhea n   Tolerating Diet y Constipation n   Other Could NOT obtain due to:   
 
Objective: VITALS:  
Last 24hrs VS reviewed since prior progress note. Most recent are: 
Patient Vitals for the past 24 hrs: 
 Temp Pulse Resp BP SpO2  
01/17/19 0744     99 % 01/17/19 0712 97.7 °F (36.5 °C) (!) 108 21 102/69 100 % 01/17/19 0310 97.9 °F (36.6 °C) (!) 116 22 101/67 100 % 01/17/19 0149     100 % 01/16/19 2230 97.9 °F (36.6 °C) (!) 120 28 112/87 100 % 01/16/19 2226     98 % 01/16/19 1938 98.2 °F (36.8 °C) (!) 107 22 (!) 88/59 100 % 01/16/19 1832  (!) 137  107/66   
01/16/19 1752  (!) 124  97/74   
01/16/19 1643  (!) 102 26 91/62 100 % 01/16/19 1538 97.6 °F (36.4 °C) (!) 110 22 92/61 100 % 01/16/19 1515     98 % 01/16/19 1130     96 % 01/16/19 1054    (!) 89/52   
01/16/19 1024 96.4 °F (35.8 °C) 99 28 (!) 80/61 96 % 01/16/19 0816 97.8 °F (36.6 °C) (!) 133 26 119/84 97 % Intake/Output Summary (Last 24 hours) at 1/17/2019 0149 Last data filed at 1/17/2019 0553 Gross per 24 hour Intake 0 ml Output 475 ml Net -475 ml PHYSICAL EXAM: 
General: Sleeping. In respiratory distress   
EENT:  Anicteric sclerae. Resp:  Diminished air entry bilaterally, diffuse wheezes. Positive accessory muscle use CV:  Tachycardia, S1 S2 present  No edema GI:  Soft, Non distended, Non tender.  +Bowel sounds Neurologic:  Alert and oriented X 3, normal speech,  
Skin:  No rashes. No jaundice Reviewed most current lab test results and cultures  YES Reviewed most current radiology test results   YES Review and summation of old records today    NO Reviewed patient's current orders and MAR    YES 
PMH/SH reviewed - no change compared to H&P 
 ________________________________________________________________________ 
________________________________________________________________________ Rk Bates MD  
 
Procedures: see electronic medical records for all procedures/Xrays and details which were not copied into this note but were reviewed prior to creation of Plan. LABS: 
I reviewed today's most current labs and imaging studies. Pertinent labs include: 
Recent Labs  
  01/17/19 
0006 01/16/19 
0230 01/15/19 
2038 WBC 16.3* 12.2* 13.5* HGB 11.4* 11.2* 11.2* HCT 34.8* 33.8* 34.4*  
* 309 411* Recent Labs  
  01/17/19 
0006 01/16/19 
0230 01/15/19 
2038 01/15/19 
1548  138  --  140  
K 3.9 4.0  --  3.5  107  --  108 CO2 22 18*  --  22 * 168*  --  144* BUN 42* 29*  --  28* CREA 0.95 0.90  --  0.88 CA 8.7 8.7  --  8.4* MG 2.9*  --   --   --   
ALB  --  2.9*  --  2.9* TBILI  --  0.3  --  0.2 SGOT  --  107*  --  130* ALT  --  58  --  57 INR  --   --  1.1  --   
 
 
Signed: Rk Bates MD

## 2019-01-17 NOTE — PROGRESS NOTES
01/17/19 1123 Vital Signs Temp 97.4 °F (36.3 °C) Temp Source Oral  
Pulse (Heart Rate) (!) 107 Heart Rate Source Monitor Resp Rate 20  
O2 Sat (%) 100 % Level of Consciousness Alert BP 94/68 MAP (Calculated) 77 BP 1 Method Automatic  
BP 1 Location Left arm BP Patient Position At rest  
MEWS Score 3 Pain 1 Pain Scale 1 Numeric (0 - 10) Pain Intensity 1 0 Patient Stated Pain Goal 0  
MEWS 3 due to elevated HR. Cardiologist aware. Charge nurse aware. Will continue to monitor.

## 2019-01-17 NOTE — ROUTINE PROCESS
1100- Dr Edwar Cruz wants pt to have liver biopsy in am as heparin has been held less than 2 hrs. MD updated pt status, respiratory and BP. High flow 02 at 10 lpm. 
1120- Returned to unit per this nurse and another. Report at bedside with Zoey Simmons primary nurse. Rounds in progress while pt being brought to room. Nurse aware that heparin is to be stopped at 0400 1/18 for biopsy in AM.

## 2019-01-17 NOTE — PROGRESS NOTES
[address]  [phone] Cardiology Progress Note 1/17/2019 10:00AM 
 
Admit Date: 1/15/2019 Admit Diagnosis:  
SOB (shortness of breath) Subjective:  
 
Cayla Pina is very tearful today, sitting with family. Quick assessment. Appears less tachypneic. Visit Vitals /69 (BP 1 Location: Left arm, BP Patient Position: At rest) Pulse (!) 108 Temp 97.7 °F (36.5 °C) Resp 21 Ht 5' 6\" (1.676 m) Wt 46.7 kg (103 lb) SpO2 99% Breastfeeding? No  
BMI 16.62 kg/m² Current Facility-Administered Medications Medication Dose Route Frequency  guaiFENesin ER (MUCINEX) tablet 600 mg  600 mg Oral BID  fluticasone-vilanterol (BREO ELLIPTA) 100mcg-25mcg/puff  1 Puff Inhalation DAILY  LORazepam (ATIVAN) injection 1 mg  1 mg IntraVENous Q4H PRN  
 morphine (ROXANOL) concentrated oral syringe 5 mg  5 mg Oral Q3H PRN  
 methylPREDNISolone (PF) (SOLU-MEDROL) injection 40 mg  40 mg IntraVENous Q12H  carvedilol (COREG) tablet 3.125 mg  3.125 mg Oral BID WITH MEALS  sodium chloride (NS) flush 5-40 mL  5-40 mL IntraVENous Q8H  
 sodium chloride (NS) flush 5-40 mL  5-40 mL IntraVENous PRN  
 levoFLOXacin (LEVAQUIN) 750 mg in D5W IVPB  750 mg IntraVENous Q24H  
 guaiFENesin (ROBITUSSIN) 100 mg/5 mL oral liquid 100 mg  100 mg Oral Q4H PRN  
 levalbuterol (XOPENEX) nebulizer soln 1.25 mg/3 mL  1.25 mg Nebulization Q4H RT  
 heparin 25,000 units in D5W 250 ml infusion  12-25 Units/kg/hr IntraVENous TITRATE  ipratropium (ATROVENT) 0.02 % nebulizer solution 0.5 mg  0.5 mg Nebulization Q4H RT  
 aspirin chewable tablet 81 mg  81 mg Oral DAILY  heparin (porcine) injection 1,400 Units  30 Units/kg IntraVENous PRN Or  
 heparin (porcine) injection 2,800 Units  60 Units/kg IntraVENous PRN Objective:  
  
Physical Exam: 
General Appearance:  cachectic  female in no acute distress Chest:   wheezing, rhonchi Cardiovascular: irr, irr , no murmur.  
Abdomen:   Soft, non-tender, bowel sounds are active.  
Extremities: no peripheral edema Skin:  Warm and dry.  
 
Data Review:  
Recent Labs  
  01/17/19 
0006 01/16/19 0230 01/15/19 
2038 WBC 16.3* 12.2* 13.5* HGB 11.4* 11.2* 11.2* HCT 34.8* 33.8* 34.4*  
* 309 411* Recent Labs  
  01/17/19 
0006 01/16/19 
0230 01/15/19 
2038 01/15/19 
1548  138  --  140  
K 3.9 4.0  --  3.5  107  --  108 CO2 22 18*  --  22 * 168*  --  144* BUN 42* 29*  --  28* CREA 0.95 0.90  --  0.88 CA 8.7 8.7  --  8.4* MG 2.9*  --   --   --   
ALB  --  2.9*  --  2.9* TBILI  --  0.3  --  0.2 SGOT  --  107*  --  130* ALT  --  58  --  57 INR  --   --  1.1  --   
 
 
Recent Labs  
  01/15/19 
2038 01/15/19 
1901 01/15/19 
1548 TROIQ 6.49* 5.19* 1.76* CPK  --   --  133 Intake/Output Summary (Last 24 hours) at 1/17/2019 1109 Last data filed at 1/17/2019 1057 Gross per 24 hour Intake 0 ml Output 475 ml Net -475 ml Telemetry: afib Echo: 
Left ventricle: Systolic function was moderately to markedly reduced. Ejection fraction was estimated in the range of 25 % to 30 %. There was 
severe hypokinesis of the mid-apical anterior and apical wall(s). Tricuspid valve: There was mild regurgitation. There was mild to moderate 
pulmonary hypertension. Assessment:  
 
Principal Problem: 
  SOB (shortness of breath) (1/15/2019) Active Problems: 
  Elevated troponin (1/16/2019) Atrial fibrillation with RVR ([address] (1/16/2019) Plan:  
 
Elevated troponin/ possible NSTEMI: 
No CP, no acute ECG changes but new onset of afib with RVR Other possible rationale for elevation is supply/demand with afib/and acute respiratory failure, CA with mets, takotsubo cardiomyopathy is also in the differential diagnosis with acute physiologic and psychological stressors Continue on IV heparin to allow for possible biopsy of liver and bronchoscopy if this is still the plan Echo EF 25-30% Continue on ASA, BB, heparin, statin, consider ACE inhibitor but blood pressure borderline low at this point No further cardiac evaluation at this time due to metastatic CA until further consultation/prognostication with oncology based on results of biopsy 
  
Afib with RVR: 
PAF, continue on BB for rate control and heparin for now CHADS 2 vasc score: 1 Arrhythmia likely r/t lung CA and acute on chronic respiratory failure 
  
Bertha Navarro NP  
  
Patient seen and examined by me with nurse practitioner Roland Khan. I personally performed all components of the history, physical, and medical decision making and agree with the assessment and plan with minor modifications as noted. Respiratory status improved some and still with no chest pain. Newly diagnosed cardiomyopathy. Awaiting biopsy results further input from oncology regarding prognosis of suspected cancer prior to any aggressive cardiac evaluation. Heparin can be held for 2-3 hours prior to biopsy and held thereafter.

## 2019-01-17 NOTE — PROGRESS NOTES
ADULT PROTOCOL: JET AEROSOL  REASSESSMENT Patient  Antony Crow     66 y.o.   female     1/17/2019  3:18 PM 
 
Breath Sounds Pre Procedure: Right Breath Sounds: Wheezing Left Breath Sounds: Wheezing Breath Sounds Post Procedure: Right Breath Sounds: Wheezing Left Breath Sounds: Wheezing Breathing pattern: Pre procedure Breathing Pattern: Tachypneic Post procedure Breathing Pattern: Tachypneic Heart Rate: Pre procedure Pulse: 104 Post procedure Pulse: 114 Resp Rate: Pre procedure Respirations: 22 Post procedure Respirations: 22 Oxygen: O2 Device: Nasal cannula  4LPM 
 
SpO2: Pre procedure SpO2: 99 % Post procedure SpO2: 99 % Nebulizer Therapy: Current medications Aerosolized Medications: Ipratropium bromide, Xopenex Smoking History: FORMER Problem List:  
Patient Active Problem List  
Diagnosis Code  Essential hypertension, benign I10  
 Dizziness and giddiness R42  Tobacco use disorder F17.200  Generalized anxiety disorder F41.1  SOB (shortness of breath) R06.02  
 Elevated troponin R74.8  Atrial fibrillation with RVR (HCC) I48.91 Respiratory Therapist: Mary Cerrato

## 2019-01-17 NOTE — PROGRESS NOTES
0700: Bedside report received from 720 Adelso Grady RN. 
 
1005:Spoke to Ernie Edwards in [facility]. Advised to stop heparin drip. 1040: Pt off unit to xray recovery. RN escorted pt due to pt being on 10L high flow oxygen. Verbal report given to RN in xray recovery. 1115: Pt returned to unit. Heparin drip restarted. Will recheck PTT in 6 hours. Advised by xray recovery RN that heparin drip will need to be stopped at 0400 and will be NPO at midnight. 1134: Dr. Cheko Elliott paged regarding diet order. 1143: Orders received to restart cardiac diet. 1900:  
Bedside shift change report given to RAHAT Ghosh (oncoming nurse). Report included the following information SBAR, Kardex, Intake/Output, MAR, Recent Results and Cardiac Rhythm Sinus Tach. SHIFT SUMMARY: 
 
 
 
 
 
[facility] NURSING NOTE Admission Date 1/15/2019 Admission Diagnosis SOB (shortness of breath) Consults IP CONSULT TO HOSPITALIST 
IP CONSULT TO CARDIOLOGY 
IP CONSULT TO INTENSIVIST 
IP CONSULT TO HEMATOLOGY 
IP CONSULT TO PALLIATIVE CARE - PROVIDER Cardiac Monitoring [x] Yes [] No  
  
Purposeful Hourly Rounding [x] Yes   
Cristóbal Score Total Score: 3 Cristóbal score 3 or > [x] Bed Alarm [] Avasys [] 1:1 sitter [] Patient refused (Signed refusal form in chart) Pop Score Pop Score: 19 Pop score 14 or < [] PMT consult [] Wound Care consult  
 []  Specialty bed  [] Nutrition consult Influenza Vaccine Received Flu Vaccine for Current Season (usually Sept-March): Yes Oxygen needs? [] Room air Oxygen @  []1L    []2L    []3L   [x]4L    []5L   []6L via NC Chronic home O2 use? [] Yes [x] No 
Perform O2 challenge test and document in progress note using smartphrase (.Homeoxygen) Last bowel movement Last Bowel Movement Date: 01/15/19 Urinary Catheter Urinary Catheter 01/16/19 Olguin-Indications for Use: Acute urinary retention/bladder outlet obstruction Urinary Catheter 01/16/19 Olguin-Urine Output (mL): 475 ml LDAs              
Peripheral IV 01/15/19 Left Antecubital (Active) Site Assessment Clean, dry, & intact 1/17/2019  2:30 PM  
Phlebitis Assessment 0 1/17/2019  2:30 PM  
Infiltration Assessment 0 1/17/2019  2:30 PM  
Dressing Status Clean, dry, & intact 1/17/2019  2:30 PM  
Dressing Type Tape;Transparent 1/17/2019  2:30 PM  
Hub Color/Line Status Pink;Flushed 1/17/2019  2:30 PM  
   
Peripheral IV  Anterior;Distal;Right Forearm (Active) Site Assessment Clean, dry, & intact 1/17/2019  2:30 PM  
Phlebitis Assessment 0 1/17/2019  2:30 PM  
Infiltration Assessment 0 1/17/2019  2:30 PM  
Dressing Status Clean, dry, & intact 1/17/2019  2:30 PM  
Dressing Type Tape;Transparent 1/17/2019  2:30 PM  
Hub Color/Line Status Infusing;Pink 1/17/2019  2:30 PM  
                  
  
Readmission Risk Assessment Tool Score Low Risk 2 Total Score 2 . Living with Significant Other. Assisted Living. LTAC. SNF. or  
Rehab Criteria that do not apply:  
 Has Seen PCP in Last 6 Months (Yes=3, No=0) Patient Length of Stay (>5 days = 3) IP Visits Last 12 Months (1-3=4, 4=9, >4=11) Pt. Coverage (Medicare=5 , Medicaid, or Self-Pay=4) Charlson Comorbidity Score (Age + Comorbid Conditions) Expected Length of Stay 4d 4h Actual Length of Stay 2

## 2019-01-17 NOTE — PROGRESS NOTES
01/17/19 1430 Vital Signs Temp 98.3 °F (36.8 °C) Temp Source Oral  
Pulse (Heart Rate) (!) 114 Heart Rate Source Monitor Resp Rate 20  
O2 Sat (%) 99 % Level of Consciousness Alert BP 97/61 MAP (Calculated) 73 BP 1 Method Automatic  
BP 1 Location Left arm BP Patient Position Sitting MEWS Score 4  
MEWS 4 due to elevated HR. Cardiologist aware. Charge nurse aware. Will continue to monitor.

## 2019-01-17 NOTE — PROGRESS NOTES
01/17/19 7357 Vital Signs Temp 97.7 °F (36.5 °C) Temp Source Axillary Pulse (Heart Rate) (!) 108 Heart Rate Source Monitor Resp Rate 21  
O2 Sat (%) 100 % Level of Consciousness Alert /69 MAP (Calculated) 80 BP 1 Method Automatic  
BP 1 Location Left arm BP Patient Position At rest  
MEWS Score 3 Pain 1 Pain Scale 1 Numeric (0 - 10) Pain Intensity 1 0 Patient Stated Pain Goal 0  
MEWS 3 due to elevated HR. Cardiologist aware. Charge nurse notified. Will continue to monitor.

## 2019-01-18 PROBLEM — Z51.5 ENCOUNTER FOR DYING CARE: Status: ACTIVE | Noted: 2019-01-01

## 2019-01-18 PROBLEM — R06.03 ACUTE RESPIRATORY DISTRESS: Status: ACTIVE | Noted: 2019-01-01

## 2019-01-18 PROBLEM — Z71.89 COUNSELING REGARDING ADVANCED CARE PLANNING AND GOALS OF CARE: Status: ACTIVE | Noted: 2019-01-01

## 2019-01-18 NOTE — PROGRESS NOTES
Problem: Pressure Injury - Risk of 
Goal: *Prevention of pressure injury Document Pop Scale and appropriate interventions in the flowsheet. Outcome: Progressing Towards Goal 
Pressure Injury Interventions: 
Sensory Interventions: Assess changes in LOC, Chair cushion, Check visual cues for pain, Discuss PT/OT consult with provider, Float heels, Keep linens dry and wrinkle-free, Maintain/enhance activity level, Minimize linen layers, Monitor skin under medical devices Moisture Interventions: Absorbent underpads, Apply protective barrier, creams and emollients, Internal/External urinary devices, Limit adult briefs, Maintain skin hydration (lotion/cream), Minimize layers, Moisture barrier Activity Interventions: Chair cushion, Increase time out of bed, PT/OT evaluation Mobility Interventions: Chair cushion, Float heels, PT/OT evaluation, Turn and reposition approx. every two hours(pillow and wedges) Nutrition Interventions: Document food/fluid/supplement intake, Discuss nutritional consult with provider, Offer support with meals,snacks and hydration Friction and Shear Interventions: Apply protective barrier, creams and emollients, Feet elevated on foot rest, Lift sheet, Minimize layers, Transferring/repositioning devices

## 2019-01-18 NOTE — PROGRESS NOTES
Problem: Falls - Risk of 
Goal: *Absence of Falls Document Tex Page Fall Risk and appropriate interventions in the flowsheet. Outcome: Progressing Towards Goal 
Fall Risk Interventions: 
Mobility Interventions: Bed/chair exit alarm, OT consult for ADLs, PT Consult for mobility concerns, Utilize walker, cane, or other assistive device Medication Interventions: Bed/chair exit alarm, Patient to call before getting OOB Elimination Interventions: Bed/chair exit alarm, Call light in reach, Patient to call for help with toileting needs Problem: Pressure Injury - Risk of 
Goal: *Prevention of pressure injury Document Pop Scale and appropriate interventions in the flowsheet. Outcome: Progressing Towards Goal 
Pressure Injury Interventions: 
Sensory Interventions: Assess changes in LOC, Assess need for specialty bed, Discuss PT/OT consult with provider, Keep linens dry and wrinkle-free, Minimize linen layers, Turn and reposition approx. every two hours (pillows and wedges if needed) Activity Interventions: Increase time out of bed Mobility Interventions: Assess need for specialty bed, PT/OT evaluation, Turn and reposition approx. every two hours(pillow and wedges) Nutrition Interventions: Document food/fluid/supplement intake

## 2019-01-18 NOTE — PROGRESS NOTES
Pt is tachypneic with severe SOB/WOB on high flow O2 and increased HR up to the 160's. Nursing is in agreement to hold eval at this time due to pts medical condition.

## 2019-01-18 NOTE — PROGRESS NOTES
Nutrition Assessment: 
 
INTERVENTIONS/RECOMMENDATIONS:  
· Resume diet when medically feasible · Continue ensure if pt desires · Aggressive nutrition intervention will depend on palliative meeting ASSESSMENT:  
Chart reviewed. Pt was briefly on a diet yesterday however poor PO intake. NPO again today. Per MD note, palliative meeting today to discuss comfort measures. Will discontinue calorie count. Diet Order: NPO 
% Eaten:   
Patient Vitals for the past 72 hrs: 
 % Diet Eaten 01/18/19 0954 0 % 01/17/19 1217 15 % 01/17/19 1057 0 % 01/16/19 0911 0 % Pertinent Medications: [x]Reviewed: solumedrol, Pertinent Labs: [x]Reviewed:  
Food Allergies: [x]NKFA  []Other Last BM:  1/15 Edema:      []RUE   []LUE   []RLE   []LLE Pressure Ulcer:      [] Stage I   [] Stage II   [] Stage III   [] Stage IV Anthropometrics: Height: 5' 6\" (167.6 cm) Weight: 46.7 kg (103 lb) IBW (%IBW):   ( ) UBW (%UBW):   (  %) BMI: Body mass index is 16.62 kg/m². This BMI is indicative of: 
[x]Underweight   []Normal   []Overweight   [] Obesity   [] Extreme Obesity (BMI>40) Last Weight Metrics: 
Weight Loss Metrics 1/15/2019 8/8/2018 8/16/2017 8/4/2016 7/30/2015 8/29/2014 7/17/2013 Today's Wt 103 lb 115 lb 14.4 oz 119 lb 125 lb 3.2 oz 124 lb 118 lb 1.6 oz 113 lb 6.4 oz BMI 16.62 kg/m2 18.71 kg/m2 19.21 kg/m2 20.22 kg/m2 20.02 kg/m2 19.07 kg/m2 18.31 kg/m2 Estimated Nutrition Needs (Based on): 1600 Kcals/day(BMR: 1075 x 1.5) , 60 g(1.3 g/kg) Protein Carbohydrate: At Least 130 g/day  Fluids: 1600 mL/day or per primary team 
 
NUTRITION DIAGNOSES:  
Problem:  Inadequate protein-energy intake Etiology: related to poor appetite, altered taste and possible hypermetabolic state Signs/Symptoms: as evidenced by 20 lbs (16%) weight loss x 2 months Previous Nutrition Dx:  [] Resolved  [] Unresolved           [x] Progressing NUTRITION INTERVENTIONS: 
 Meals/Snacks: General/healthful diet   Supplements: Commercial supplement GOAL:  
consume >50% of meals and 75% of ONS in 2-4 days NUTRITION MONITORING AND EVALUATION Food/Nutrient Intake Outcomes: Total energy intake Physical Signs/Symptoms Outcomes: Weight/weight change, Electrolyte and renal profile, GI 
 
Previous Goal Met: 
 [] Met              [x] Progressing Towards Goal              [] Not Progressing Towards Goal  
Previous Recommendations: 
 [x] Implemented          [] Not Implemented          [] Not Applicable LEARNING NEEDS (Diet, Food/Nutrient-Drug Interaction):  
 [x] None Identified 
 [] Identified and Education Provided/Documented 
 [] Identified and Pt declined/was not appropriate Cultural, Spiritism, OR Ethnic Dietary Needs:  
 [x] None Identified 
 [] Identified and Addressed 
 
 [x] Interdisciplinary Care Plan Reviewed/Documented  
 [x] Discharge Planning: looking like comfort feeding 
 [] Participated in Interdisciplinary Rounds NUTRITION RISK:  
 [x] High              [] Moderate           []  Low  []  Minimal/Uncompromised Ivory Neumann RDN Pager [phone] Weekend Pager [phone]

## 2019-01-18 NOTE — PROGRESS NOTES
Bedside and Verbal shift change report given to RAHAT salazar (oncoming nurse). Report included the following information SBAR, Kardex, ED Summary, Procedure Summary, Intake/Output, MAR and Recent Results. SHIFT SUMMARY: 
7am: rns spoke to eloina menon rn about pt's condition and code status. Mallorie Nichols to speak to palliative np jennifer. 8: rn paged dr Martin Nugent 811: MD at bedside discussing pt's code status with pt's . Mallorie Nichols RRT RN at bedside as well. Pt's O2% 82 on 5L. High flow initiated at 6L then titrated to 10 per pt need. Pt's O2 sats rebounded to 90%. 915: lynsey rrt rn advises pt's rn to give morphine and ativan when available providing pt's vitals can tolerate. 925: rn spoke to lynsey, who is coming as pt's %O2 is rapidly declining despite O2 increase. rn paged dr Martin Nugent. 925: lynsey at bedside. 936: dorian pop at bedside w/ mikhail charge rn. Mallorie Nichols and md with pt's fam discussing code status. 1136: paged dr Martin Nugent 
[phone]: eloina menon states ok to give oral morphine w/ SBP above 90.  
 
1230: np justin has spoken to family and states goal of care is comfort and ok to give IV morphine/ativan as available if pt desires, irregardless of vital signs. 1247: pt states she does want anxiety medicine. 117: pt denies pain and states she does not want medicine at this time. 147: pt again declines and anxiety or pain medicine. Pt denies pain. 244: NP justin notified that all pt's fam is here. 351: pt declining pain medication at this time. 356: pt to be admitted to hospice in am. INDERJIT Young states call her overnight if problems/concerns ([phone]). 426: patient no longer verbalizing responses and only opened eyes when rns moved her from a sitting to lying position in the bed. Pt's rate of breathing has decreased significantly and pt does not appear to be in pain. 445: eloina menon rn in agreement with rn that pt appears comfortable and ok to hold meds at this time. 549: pt states she does want pain meds at this time. 628: pt states she does want anxiety meds at this time.

## 2019-01-18 NOTE — PROGRESS NOTES
Hospitalist Progress Note NAME: Patricio Negron :  1960 MRN:  [id number] Assessment / Plan: 
Goals of care Patient is sitting on 90 degree and in respiratory distress, she is on high flow nasal cannula at 10 L NC with Sats dropping in the 88% and Hr in the 133s. Patient not candidate for liver biopsy at this time given poor respiratory status and being unstable. I discussed with  and cousin the patient's diagnosis, serious medical condition and very poor prognosis.  agrees that patient would not want to be in a ventilator machine or suffer, he agrees with DNR/DNI code status. He states his daughters and sons are coming to the hospital and would like to have a meeting with palliative care today to discuss comfort measures. Acute respiratory failure with hypoxia secondary to end-stage COPD with severe emphysema, metastatic cancer to liver, afib with RVR, possible NSTEMI End-stage COPD/severe emphysema Post-obstructive PNA Metastatic cancer NSTEMI with new diagnosis of systolic heart failure EF 25% 
afib with RVR  Troponin peaked at 6 CT chest revealed bulky mediastinal lymphadenopathy, severe emphysema, mild post-obstructive disease in the Left lung Respiratory status continues to decline despite medical therapy. On duonebs, solumedrol, oxygen, mucinex, coreg, heparin drip, levaquin, ASA, and statin. Cardiology and Oncology team are following. Recommendation reviewed and agreed. Hypertension Low normal blood pressure. holding home losartan/HCTZ Monitor. Severe protein-calorie malnutrition in the setting of poor po intake in 58F treated with Dietitian consult 
prn supplements per diet  
  
Body mass index is 16.62 kg/m². therefore classifying patient as underweight 
  
I have personally reviewed the radiographs, laboratory data in Epic and decisions and statements above are based partially on this personal interpretation. 
  
Code Status: Full Code DVT Prophylaxis: Hep SQ 
GI Prophylaxis: not indicated Subjective: Chief Complaint / Reason for Physician Visit SOB Patient is very short of breath on 90 degree Review of Systems: 
 
Could NOT obtain due to: Confusion. Respiratory status Objective: VITALS:  
Last 24hrs VS reviewed since prior progress note. Most recent are: 
Patient Vitals for the past 24 hrs: 
 Temp Pulse Resp BP SpO2  
01/18/19 0937  (!) 116  106/70 93 % 01/18/19 0928  (!) 119   91 % 01/18/19 0927     92 % 01/18/19 0926     90 % 01/18/19 0925     (!) 89 % 01/18/19 0923     (!) 88 % 01/18/19 0922     (!) 88 % 01/18/19 0909  (!) 129 25 108/71 91 % 01/18/19 0811 98 °F (36.7 °C) (!) 132 26 107/87 90 % 01/18/19 0810     90 % 01/18/19 0809     (!) 83 % 01/18/19 0807     (!) 82 % 01/18/19 0710  (!) 113     
01/18/19 0427 97.8 °F (36.6 °C) (!) 132 28 115/84 91 % 01/18/19 0009     92 % 01/17/19 2352 98.3 °F (36.8 °C) (!) 123 24 108/88 94 % 01/17/19 1915 97.9 °F (36.6 °C) (!) 114 22 106/76 98 % 01/17/19 1906     95 % 01/17/19 1722  (!) 115  104/77   
01/17/19 1643     98 % 01/17/19 1430 98.3 °F (36.8 °C) (!) 114 20 97/61 99 % 01/17/19 1217     99 % 01/17/19 1205     99 % 01/17/19 1201     99 % 01/17/19 1123 97.4 °F (36.3 °C) (!) 107 20 94/68 100 % 01/17/19 1100  (!) 114 22 99/77 97 % Intake/Output Summary (Last 24 hours) at 1/18/2019 7275 Last data filed at 1/18/2019 1249 Gross per 24 hour Intake 195 ml Output 350 ml Net -155 ml PHYSICAL EXAM: 
General/neuro: In moderate respiratory distress. Answers 2012 to year.   
EENT:  Anicteric sclerae. Resp:  Diminished air entry bilaterally, diffuse wheezes. Positive accessory muscle use CV:  Tachycardia, S1 S2 present  No edema GI:  Soft, Non distended, Non tender.  +Bowel sounds Neurologic:  Alert and oriented X 3, normal speech,  
 Skin:  No rashes. No jaundice Reviewed most current lab test results and cultures  YES Reviewed most current radiology test results   YES Review and summation of old records today    NO Reviewed patient's current orders and MAR    YES 
PMH/SH reviewed - no change compared to H&P 
________________________________________________________________________ 
________________________________________________________________________ Hosea Viera MD  
 
Procedures: see electronic medical records for all procedures/Xrays and details which were not copied into this note but were reviewed prior to creation of Plan. LABS: 
I reviewed today's most current labs and imaging studies. Pertinent labs include: 
Recent Labs  
  01/18/19 
0000 01/17/19 
0006 01/16/19 
0230 WBC 23.0* 16.3* 12.2* HGB 11.5 11.4* 11.2* HCT 34.8* 34.8* 33.8* * 450* 309 Recent Labs  
  01/17/19 
0006 01/16/19 
0230 01/15/19 
2038 01/15/19 
1548  138  --  140  
K 3.9 4.0  --  3.5  107  --  108 CO2 22 18*  --  22 * 168*  --  144* BUN 42* 29*  --  28* CREA 0.95 0.90  --  0.88 CA 8.7 8.7  --  8.4* MG 2.9*  --   --   --   
ALB  --  2.9*  --  2.9* TBILI  --  0.3  --  0.2 SGOT  --  107*  --  130* ALT  --  58  --  57 INR  --   --  1.1  --   
 
 
Signed: Hosea Viera MD

## 2019-01-18 NOTE — PROGRESS NOTES
Morphine has seemed to wear off, patients awake again, WOB increased, sitting on side of bed with RR's in the 30's, O2 saturation in the low 90's, HR is elevated jumping to the 160's. Respiratory therapy aware and following. No breathing treatments at this time. Will try another dose of ativan. If doesn't work will call MD again and possibly a rapid.

## 2019-01-18 NOTE — PROGRESS NOTES
01/18/19 8998 Vital Signs Temp 98 °F (36.7 °C) Temp Source Oral  
Pulse (Heart Rate) (!) 132 Resp Rate 26  
O2 Sat (%) 90 % Level of Consciousness Alert /87 MAP (Calculated) 94 MEWS Score 5  
MD at bedside

## 2019-01-18 NOTE — PROGRESS NOTES
CM has determined that an assessment is not appropriate at this time. Family with be meeting with palliative today to discuses comfort measures. CM will continue to follow. 2:39pm 
Per consult, CM sent a referral to [address] [place] [address] for inpatient hospice consult. Seun Santana 
[id number]

## 2019-01-18 NOTE — PROGRESS NOTES
Palliative Medicine Consult Vince: [phone]) Patient Name: Ana Rincon YOB: 1960 Date of Initial Consult: 1/15/2019 Reason for Consult: End Stage Disease Requesting Provider: Alma Delgado MD 
Primary Care Physician: Gene Beckham MD 
 
 SUMMARY:  
Ana Rincon is a 66 y.o. with a past history of GERD, previous smoker, and HTN, who was admitted on 1/15/2019 from home with a diagnosis of NSTEMI with an elevated proBNP and elevated Troponin. She presented to the ED for sudden onset of shortness of breath with associated nonproductive cough. She reported that she had been coughing for a week, but today she had a coughing fit that lead to shortness of breath. Jan 8th, she had a CT scan done of her chest because of the persistent cough, and was to follow up with pulmonary because of concerning nodules found. Of note she has also had a 26lb weight loss over the past few month This admission, there is significant concern for metastatic carcinoma- with the findings in her lungs, lesions on her liver, and the significant weight loss. She is going to have a biopsy of her liver done at some point to determine treatment option From a cardiac standpoint, she developed a-fib with RVR this admission and was started on heparin. It is no longer though that she had a STEMI, but rather the elevated troponin was from supply/demand with afib/acute resp failure Possible Tocco Subu cardiomyopathy is also possible She is scheduled for a cath later this admission, based on the evaluation of her lung cancer Current medical issues leading to Palliative Medicine involvement include: symptom management (shortness of breath)  And goals of care in the setting of a new cancer diagnosis with extreme symptom burden PALLIATIVE DIAGNOSES:  
1. Shortness of breath 2. Anxiety 3. Caregiver strain 4. Goals of care discussion 5. Cachectic 6. End of life care  PLAN:  
 1. 12:00pm-1:30pm:  Met with  and extended family: discussed pt's death is imminent, she is actively dying, she is too ill for aggressive treatments, we can provide comfort care. Family is in shock that pt appeared just fine to them a week ago and now she is dying; counseled family that it's not uncommon for people to ignore symptoms because they are too busy, that they are able to compensate until disease becomes so advanced that we find ourselves in this position. Family asked if she would still be alive late this afternoon when daughter gets here; I told them I wouldn't be surprised if she dies before then, and we discussed family getting daughter now. Family is agreeable to comfort measures only. 2. Comfort orders placed. 3. Hospice notified to evaluate for inpatient Hospice. Not sure she will survive that long. 4. 1:30pm: pt is unresponsive and agonal breathing. 5. 3:15-3:45pm: met again with family, hospice RN, , again discussed POC is CMO, will transfer pt to Oncology [facility], Hospice will re-evaluate for inpatient admission tomorrow am.  
6. Initial consult note routed to primary continuity provider 7. Communicated plan of care with: Palliative IDT, RN Valerie Mortimer GOALS OF CARE / TREATMENT PREFERENCES:  
 
GOALS OF CARE: 
Patient/Health Care Proxy Stated Goals: Comfort TREATMENT PREFERENCES:  
Code Status: DNR Advance Care Planning: 
[x] [facility] Interdisciplinary Team has updated the ACP Navigator with [address] and Patient Capacity Primary Decision Maker (Health Care Agent): Calvin Cardona Relationship to patient:  Phone number: [phone] [] Named in a scanned document  
[x] Legal Next of Kin 
[] Guardian Secondary Decision Maker (First Alternate Health Care Agent):  
Relationship to patient: 
Phone number: 
[] Named in a scanned document  
[] Legal Next of Kin 
[] Guardian Medical Interventions: Comfort measures Other Instructions:  
Artificially Administered Nutrition: No feeding tube Other: As far as possible, the palliative care team has discussed with patient / health care proxy about goals of care / treatment preferences for patient. HISTORY:  
 
History obtained from: chart, RN 
 
CHIEF COMPLAINT: patient unable to talk much, was very short of breath HPI/SUBJECTIVE: The patient is:  
[] Verbal and participatory [x] Non-participatory due to: shortness of breath Patietn in bed, bed at 90 degree angle and she was still unable to get her breathing under control Working very hard to breathe, eyes closed Echo tech at bedside performing test 
After 5mg of Roxinol and 1mg IV Ativan, patient was able to slow breathing a bit, and was resting, although still having retractions Respiratory therapy was on their way to set up high flow oxygen 1/18: sitting up in bed 90 degrees, dyspneic denies pain. Clinical Pain Assessment (nonverbal scale for severity on nonverbal patients):  
Clinical Pain Assessment Severity: 0 Activity (Movement): Restless, excessive activity and/or withdrawal reflexes Duration: for how long has pt been experiencing pain (e.g., 2 days, 1 month, years) Frequency: how often pain is an issue (e.g., several times per day, once every few days, constant) FUNCTIONAL ASSESSMENT:  
 
Palliative Performance Scale (PPS): PPS: 10 PSYCHOSOCIAL/SPIRITUAL SCREENING:  
 
Palliative IDT has assessed this patient for cultural preferences / practices and a referral made as appropriate to needs (Cultural Services, Patient Advocacy, Ethics, etc.) Any spiritual / Quaker concerns: 
[] Yes /  [x] No 
 
Caregiver Burnout: 
[] Yes /  [x] No /  [] No Caregiver Present Anticipatory grief assessment:  
[x] Normal  / [] Maladaptive ESAS Anxiety: Anxiety: 4 ESAS Depression:   unable to assess due to pt factors  REVIEW OF SYSTEMS:  
 
 Positive and pertinent negative findings in ROS are noted above in HPI. The following systems were [x] reviewed / [] unable to be reviewed as noted in HPI Other findings are noted below. Systems: constitutional, ears/nose/mouth/throat, respiratory, gastrointestinal, genitourinary, musculoskeletal, integumentary, neurologic, psychiatric, endocrine. Positive findings noted below. Modified ESAS Completed by: provider Fatigue: 8 Pain: 0 Anxiety: 4 Dyspnea: 8 PHYSICAL EXAM:  
 
From RN flowsheet: 
Wt Readings from Last 3 Encounters:  
01/15/19 103 lb (46.7 kg) 08/08/18 115 lb 14.4 oz (52.6 kg) 08/16/17 119 lb (54 kg) Blood pressure 93/55, pulse (!) 110, temperature 97.4 °F (36.3 °C), resp. rate 25, height 5' 6\" (1.676 m), weight 103 lb (46.7 kg), SpO2 94 %, not currently breastfeeding. Pain Scale 1: Numeric (0 - 10) Pain Intensity 1: 0 Last bowel movement, if known:  
 
Constitutional: frail, cachexia, appears older than stated age, denies pain, in acute distress from extreme shortness of breath Cardiovascular: tachycardic Respiratory: labored breathing, retractions, distress, on 6LPM nasal canula Skin: warm, dry Neurologic: fatigued, able to communicate some, but generally focused on breathing HISTORY:  
 
Principal Problem: 
  SOB (shortness of breath) (1/15/2019) Active Problems: 
  Elevated troponin (1/16/2019) Atrial fibrillation with RVR ([address] (1/16/2019) Past Medical History:  
Diagnosis Date  Atrial fibrillation with RVR ([address] 1/16/2019  Dizziness and giddiness  Elevated troponin 1/16/2019  GERD (gastroesophageal reflux disease)  Tobacco use disorder History reviewed. No pertinent surgical history. History reviewed. No pertinent family history. History reviewed, no pertinent family history. Social History Tobacco Use  Smoking status: Former Smoker Packs/day: 1.50 Years: 20.00 Pack years: 30.00 Types: Cigarettes  Smokeless tobacco: Never Used  Tobacco comment: 2-3 CIAGARETTES DAILY OR SOMETIMES Substance Use Topics  Alcohol use: No  
  Alcohol/week: 0.0 oz No Known Allergies Current Facility-Administered Medications Medication Dose Route Frequency  lidocaine (PF) (XYLOCAINE) 10 mg/mL (1 %) injection 8 mL  8 mL SubCUTAneous RAD ONCE  
 LORazepam (ATIVAN) injection 1 mg  1 mg IntraVENous Q1H PRN  
 morphine 10 mg/ml injection 5 mg  5 mg IntraVENous Q30MIN PRN  
 glycopyrrolate (ROBINUL) injection 0.2 mg  0.2 mg IntraVENous Q4H PRN  
 guaiFENesin ER (MUCINEX) tablet 600 mg  600 mg Oral BID  fluticasone-vilanterol (BREO ELLIPTA) 100mcg-25mcg/puff  1 Puff Inhalation DAILY  methylPREDNISolone (PF) (SOLU-MEDROL) injection 40 mg  40 mg IntraVENous Q12H  
 sodium chloride (NS) flush 5-40 mL  5-40 mL IntraVENous Q8H  
 sodium chloride (NS) flush 5-40 mL  5-40 mL IntraVENous PRN  
 guaiFENesin (ROBITUSSIN) 100 mg/5 mL oral liquid 100 mg  100 mg Oral Q4H PRN  
 levalbuterol (XOPENEX) nebulizer soln 1.25 mg/3 mL  1.25 mg Nebulization Q4H RT  
 ipratropium (ATROVENT) 0.02 % nebulizer solution 0.5 mg  0.5 mg Nebulization Q4H RT  
 
 
 
 LAB AND IMAGING FINDINGS:  
 
Lab Results Component Value Date/Time WBC 23.0 (H) 01/18/2019 12:00 AM  
 HGB 11.5 01/18/2019 12:00 AM  
 PLATELET 566 (H) 16/40/6123 12:00 AM  
 
Lab Results Component Value Date/Time Sodium 136 01/17/2019 12:06 AM  
 Potassium 3.9 01/17/2019 12:06 AM  
 Chloride 104 01/17/2019 12:06 AM  
 CO2 22 01/17/2019 12:06 AM  
 BUN 42 (H) 01/17/2019 12:06 AM  
 Creatinine 0.95 01/17/2019 12:06 AM  
 Calcium 8.7 01/17/2019 12:06 AM  
 Magnesium 2.9 (H) 01/17/2019 12:06 AM  
  
Lab Results Component Value Date/Time AST (SGOT) 107 (H) 01/16/2019 02:30 AM  
 Alk.  phosphatase 151 (H) 01/16/2019 02:30 AM  
 Protein, total 7.6 01/16/2019 02:30 AM  
 Albumin 2.9 (L) 01/16/2019 02:30 AM  
 Globulin 4.7 (H) 01/16/2019 02:30 AM  
 
Lab Results Component Value Date/Time INR 1.1 01/15/2019 08:38 PM  
 Prothrombin time 11.6 (H) 01/15/2019 08:38 PM  
 aPTT 47.6 (H) 01/18/2019 12:00 AM  
  
No results found for: IRON, FE, TIBC, IBCT, PSAT, FERR No results found for: PH, PCO2, PO2 No components found for: [place] Lab Results Component Value Date/Time  01/15/2019 03:48 PM  
  
 
 
   
 
Total time: 240 min Counseling / coordination time, spent as noted above: 210 min 
> 50% counseling / coordination?: y 
 
Prolonged service was provided for  []30 min   []75 min in face to face time in the presence of the patient, spent as noted above. Time Start:  
Time End:  
Note: this can only be billed with 12318 (initial) or 97580 (follow up). If multiple start / stop times, list each separately.

## 2019-01-18 NOTE — PROGRESS NOTES
Staff requested  visit with patient's family in [address] [place] [address] Pulmonary Care.  offered emotional support to patient's family.  spoke with patient's nurse, Ham Blankenship. Plan of care is to follow up with patient's family. [facility] Dunn Div  Paging Service [phone])

## 2019-01-18 NOTE — ROUTINE PROCESS
0900- Into pt room to assess pt after talking with Dr Tedi Heimlich to see if pt would be able to complete liver biopsy.  with pt. 02 at 10 LPM. Pt sitting upright with labored resps. Sats 89-90% Hr 130's. Spoke with primary nurse Sergei Werner. Advised that respiratory, rapid assessment assess respiratory status. Called Dr Tedi Heimlich and updated. Rapid response spoke with Dr Tedi Heimlich. Pt unable to have biopsy at this time.

## 2019-01-18 NOTE — PROGRESS NOTES
Patient is very short of breath, tachypneic at 30 bpm, tachycardic in 150's breifly but back to her baseline of 110bpm, BP- 108/88mmhg, patient very anxious. Received Ativan 1 mg IV at 2300, Morphine po 5 mg at midnight without any relief. Chart reviewed. Administer IV Morphine 5 mg x 1 now and repeat in 30 mins if shortness of breath does not improve. Very high risk for deterioration given, monitor closely. 0169- patient responded to 5 mg IV morphine, resting comfortably although still tachypneic.

## 2019-01-18 NOTE — PROGRESS NOTES
offered emotional spiritual support during family consult with palliative and hospice representatives. Patient's family stated they appreciated the  being present during this time. Plan of care is to follow up with patient's family as needed. [address] ANETA Alvarez Div  Paging Service [phone])

## 2019-01-18 NOTE — PROGRESS NOTES
Orders received, chart reviewed. Spoke to RN who reports that pt is not appropriate for participation in PT evaluation secondary to increased SOB/WOB on high flow O2 and increased HR to 160s. Will hold evaluation however continue to follow.  
 
Timothy Perera, PT, DPT

## 2019-01-18 NOTE — PROGRESS NOTES
Patient declining with respirations and HR increasing. WOB increased. Messaged MD and IV morphine was ordered. Hold breathing treatments until HR more under control. Respiratory therapist involved and aware.

## 2019-01-18 NOTE — PROGRESS NOTES
ADULT PROTOCOL: JET AEROSOL  REASSESSMENT Patient  Sperry Sizer     66 y.o.   female     1/18/2019  2:23 PM 
 
Breath Sounds Pre Procedure: Right Breath Sounds: Expiratory wheezing Left Breath Sounds: Expiratory wheezing Breath Sounds Post Procedure: Right Breath Sounds: Wheezing Left Breath Sounds: Wheezing Breathing pattern: Pre procedure Breathing Pattern: Dyspnea with exertion, Dyspnea at rest 
        Post procedure Breathing Pattern: Tachypneic Heart Rate: Pre procedure Pulse: 128 Post procedure Pulse: 114 Resp Rate: Pre procedure Respirations: 20 
         Post procedure Respirations: 22 Oxygen: O2 Device: Hi flow nasal cannula  10 LPM 
 
SpO2: Pre procedure SpO2: 92 % Post procedure SpO2: 99 % Nebulizer Therapy: Current medications Aerosolized Medications: Ipratropium bromide, Xopenex Smoking History: FORMER Problem List:  
Patient Active Problem List  
Diagnosis Code  Essential hypertension, benign I10  
 Dizziness and giddiness R42  Tobacco use disorder F17.200  Generalized anxiety disorder F41.1  SOB (shortness of breath) R06.02  
 Elevated troponin R74.8  Atrial fibrillation with RVR (HCC) I48.91 Respiratory Therapist: Karina Hale

## 2019-01-18 NOTE — PROGRESS NOTES
Problem: Falls - Risk of 
Goal: *Absence of Falls Document Denisse Getting Fall Risk and appropriate interventions in the flowsheet. Outcome: Progressing Towards Goal 
Fall Risk Interventions: 
Mobility Interventions: Bed/chair exit alarm, Communicate number of staff needed for ambulation/transfer, OT consult for ADLs, Patient to call before getting OOB, PT Consult for mobility concerns, PT Consult for assist device competence, Strengthening exercises (ROM-active/passive), Utilize walker, cane, or other assistive device, Utilize gait belt for transfers/ambulation Mentation Interventions: Adequate sleep, hydration, pain control, Door open when patient unattended, Evaluate medications/consider consulting pharmacy, Bed/chair exit alarm, Familiar objects from home, Increase mobility, More frequent rounding, Gait belt with transfers/ambulation, Family/sitter at bedside Medication Interventions: Bed/chair exit alarm, Evaluate medications/consider consulting pharmacy, Patient to call before getting OOB, Teach patient to arise slowly, Utilize gait belt for transfers/ambulation Elimination Interventions: Bed/chair exit alarm, Call light in reach, Patient to call for help with toileting needs, Toileting schedule/hourly rounds

## 2019-01-19 PROBLEM — J96.90 RESPIRATORY FAILURE (HCC): Status: ACTIVE | Noted: 2019-01-01

## 2019-01-19 NOTE — ROUTINE PROCESS
Bedside report provided by Shady Sandoval RN. SBAR report, Kardex, Cardiac Rhythm (ST), MAR, and pt general condition reviewed. Pt appears comfortably somnolent. Family at bedside and in hallways.

## 2019-01-19 NOTE — HOSPICE
[facility] RN consultation visit note. Order for hospice noted. History and events of this hospitalization reviewed. In to visit with patient and family with Kirill Ly. Patient sitting straight up in bed with hands/zrms resting on thighs   RR 34 with quick shallow breaths. O2 at 14 l/min via NC. Pale, skin warm and dry, lungs with coarse inspiratory nd expiratory sounds. Olguin for very dark clear kemal urine. Pedal pulses weak. Order received from Kirill Ly to admit patient to inpatient [facility] LOC for hospice as there is high likelihood of rapid decline as well as symptoms above in spite of Morphine 5 mg IV x 4 in previous 22 hours. Above discussed with Dr. Leigh Hutson who deferred to Dr. Crissy Mcqueen to be hospice attending MD. Please call [phone] if questions or concerns Roselyn Bond RN [facility]

## 2019-01-19 NOTE — PROGRESS NOTES
1130: TRANSFER - OUT REPORT: 
 
Verbal report given to Aelx Asthon RN(name) on Aleshia Stauffer  being transferred to Inpatient Hospice(unit) for routine progression of care Report consisted of patients Situation, Background, Assessment and  
Recommendations(SBAR). Information from the following report(s) SBAR, Kardex, Intake/Output, MAR and Recent Results was reviewed with the receiving nurse. Lines:  
Peripheral IV 01/15/19 Left Antecubital (Active) Site Assessment Clean, dry, & intact 1/19/2019  7:59 AM  
Phlebitis Assessment 0 1/19/2019  7:59 AM  
Infiltration Assessment 0 1/19/2019  7:59 AM  
Dressing Status Clean, dry, & intact 1/19/2019  7:59 AM  
Dressing Type Tape;Transparent 1/19/2019  7:59 AM  
Hub Color/Line Status Pink;Flushed 1/19/2019  7:59 AM  
   
Peripheral IV  Anterior;Distal;Right Forearm (Active) Site Assessment Clean, dry, & intact 1/19/2019  7:59 AM  
Phlebitis Assessment 0 1/19/2019  7:59 AM  
Infiltration Assessment 0 1/19/2019  7:59 AM  
Dressing Status Clean, dry, & intact 1/19/2019  7:59 AM  
Dressing Type Tape;Transparent 1/19/2019  7:59 AM  
Hub Color/Line Status Pink;Flushed 1/19/2019  7:59 AM  
  
 
Opportunity for questions and clarification was provided. Patient transported with: 
 O2 @ 6 liters Nurse Tech

## 2019-01-19 NOTE — PROGRESS NOTES
Problem: Pressure Injury - Risk of 
Goal: *Prevention of pressure injury Document Pop Scale and appropriate interventions in the flowsheet. Outcome: Progressing Towards Goal 
Pressure Injury Interventions: 
Sensory Interventions: Assess changes in LOC Moisture Interventions: Absorbent underpads, Internal/External urinary devices Activity Interventions: Assess need for specialty bed, Increase time out of bed, PT/OT evaluation Mobility Interventions: Assess need for specialty bed, HOB 30 degrees or less, Pressure redistribution bed/mattress (bed type), PT/OT evaluation, Turn and reposition approx. every two hours(pillow and wedges) Nutrition Interventions: Document food/fluid/supplement intake, Discuss nutritional consult with provider, Offer support with meals,snacks and hydration Friction and Shear Interventions: Apply protective barrier, creams and emollients, Feet elevated on foot rest, HOB 30 degrees or less, Lift sheet, Minimize layers

## 2019-01-19 NOTE — PROGRESS NOTES
1100 
Cardiopulmonary Care Interdisciplinary Rounds were held today to discuss patient's plan of care and outcomes. The following members were present: NP/Physician, Pharmacy, Nursing and Case Management. Expected Length of Stay:  4d 4h 
 
Plan of Care: Continue current treatment plan, patient moving to comfort care.

## 2019-01-19 NOTE — HOSPICE
[employer] Good Help to Those in Need 
[phone] Inpatient Nursing Admission Patient Name: Asmita Marquez YOB: 1960 Age: 66 y.o. Date of Hospice Admission: 1/19/2019 Hospice Attending Elected by Patient: Richy Smalls MD 
Primary Care Physician: Araceli Khalil MD 
Admitting RN:   Laure Perez : not present Level of Care (GIP/Routine/Respite): [facility] Facility of Care: [facility] Patient Room: [facility]/ WORKSHEET FOR DETERMINING PROGNOSIS 
PULMONARY DISEASE The purpose of this worksheet is to guide initial and recertification assessments. It must be accompanied by narrative documentation. These are guidelines only: clinical judgement is required in each case. Construct a narrative from the information on this worksheet and information from the patients physician and record on back. The patient should be re-evaluated at specific intervals set by the interdisciplinary team as well as at any time that the patient may be clinically stabilizing. This form may be used for initial and subsequent re-evaluation. Patients will be considered to be in the terminal stage of pulmonary disease (life expectancy of six months or less) if they meet the following criteria. The criteria refer to patients with various forms of advanced pulmonary disease who eventually follow a final common pathway for end stage pulmonary disease. (1 and 2 should be present. Documentation of 3, 4, and 5, will lend supporting documentation.): 
 
___X_____  1. Severe chronic lung disease as documented by both a and b: 
                        ____X____  a.  Disabling dyspnea at rest, poorly or unresponsive to bronchodilators,  
                                                resulting in decreased functional capacity, e.g., bed to chair existence,  
                                                fatigue, and cough: (Documentation of Forced Expiratory Volume in One  
 Second (FEV1), after bronchodilator, less than 30% of predicted is objective                 
                                                evidence for disabling dyspnea, but is not necessary to obtain.) 
                        __X______  b. Progression of end stage pulmonary disease, as evidenced by increasing visits  
                                                to the emergency department or hospitalizations for pulmonary infections  
                                                and/or respiratory failure or increasing physician home visits prior to initial  
                                                certification. (Documentation of serial decrease of FEV1>40 ml/year is                    
                                                objective evidence for disease progression, but is not necessary to obtain.) 
___X_____  2. Hypoxemia at rest on room air, as evidenced by pO2 ? 55 mmHg; or oxygen saturation ? 88%, determined either by arterial blood gases or oxygen saturation monitors; (These  
                         values may be obtained from recent hospital records. ) OR Hypercapnia, as evidenced by  
                         pCO2 ?50 mmHg. (This value may be obtained from recent [within 3 months] hospital  
                         records.) 
________  3. Right heart failure (RHF) secondary to pulmonary disease (Cor pulmonale) (e.g., not  
                        secondary to left heart disease or valvulopathy). _____X___  4. Unintentional progressive weight loss of greater than 10% of body weight over the  
                         preceding six months. __X______  5. Resting tachycardia >100/min. HOSPICE SUMMARY  
ER Visits/ Hospitalizations in past year: Many outpatient visits for respiratory distress Hospice Diagnosis: Respiratory Failure Respiratory failure ([address] Onset Date of Hospice Diagnosis: Admitted to [facility] through the ER on 1/15/19 Co-Morbidities:  
Patient Active Problem List  
Diagnosis Code  Essential hypertension, benign I10  
 Dizziness and giddiness R42  Tobacco use disorder F17.200  Generalized anxiety disorder F41.1  SOB (shortness of breath) R06.02  
 Elevated troponin R74.8  Atrial fibrillation with RVR (HCC) I48.91  
 Acute respiratory distress R06.03  
 Encounter for dying care Z51.5  Counseling regarding advanced care planning and goals of care Z71.89  Respiratory failure ([address] J96.90 Diagnoses RELATED to the terminal prognosis: Acute respiratory failure with hypoxia secondary to end-stage COPD with severe emphysema,, afib with RVR, End-stage COPD/severe emphysema Post-obstructive PNA 
NSTEMI with new diagnosis of systolic heart failure EF 25% 
afib with RVR Hypertension 
 
  
Other Diagnoses:  metastatic cancer to liver   possible NSTEMI       Severe protein-calorie malnutrition in the setting of poor po intake Body mass index is 16.62 kg/m². therefore classifying patient as underweight Rationale for a prognosis of life expectancy of 6 months or less if the disease follows its normal course (Disease Specific History):  
 
Admitted through the ER on 1/15 with dx of NSTEMI with elevated proBNP and troponin. Had been having weight loss and increased respiratory distress for a couple of weeks. Jan 8th had CT scan done and was to follow up because of cerning nodules found. Had a 26 lb weight loss ofver the prevous few months. Testing during this admit led to significant concern for metastatic carcinoma in her lungs with lesions on her livfer.    
Atrial fibrillation started during this admit with RVR and started on Heparin  GERD, previous smoker, and HTN, who was admitted on 1/15/2019 from home with a diagnosis of NSTEMI with an elevated proBNP and elevated Troponin.  
  
 She presented to the ED for sudden onset of shortness of breath with associated nonproductive cough. She reported that she had been coughing for a week, but today she had a coughing fit that lead to shortness of breath. Jan 8th, she had a CT scan done of her chest because of the persistent cough, and was to follow up with pulmonary because of concerning nodules found. ory distress that was too burdensome. PC consulted and hospice recommended by MDs and accepted by family Agnes Orozco is a 66 y.o. who was admitted to [facility]. The patient's principle diagnosis of RESPIRATORY FAILURE  has resulted in marked respiratory distress with paytient tripoding, being minimally responsive, with RR in 30's and quick, pulmonary congestion/adventitioyus sounds, wet irritating non productive irritating cough, restlessness and agitation. Functionally, the patient's Palliative Performance Scale has declined over a period of 2 weeks  and is estimated at 10 Objective information that support this patients limited prognosis includes: SEE LABS BELOW CXR IMPRESSION: 
Moderate to severe apical emphysematous change with mediastinal and hilar Lymphadenopathy. Nayan Jaquez CHEST CT 
IMPRESSION: 
1. No evidence of pulmonary embolus. 2.  Bulky mediastinal and bilateral hilar lymphadenopathy, narrowing the 
pulmonary arteries, trachea, and bronchi to both lungs. Findings are not 
significantly changed. 3. Background of severe emphysema. 4. Mild postobstructive airspace disease in the left upper and lower lobes ECHO 
LEFT VENTRICLE: Size was normal. Systolic function was moderately to 
markedly reduced. Ejection fraction was estimated in the range of 25 % to 
30 %. There was severe hypokinesis of the mid-apical anterior and apical 
wall(s).  Wall thickness was normal. 
 
RIGHT VENTRICLE: The size was normal. Systolic function was normal. 
 
LEFT ATRIUM: Size was normal. 
 
RIGHT ATRIUM: Size was normal. 
 
 MITRAL VALVE: Normal valve structure. There was normal leaflet separation. DOPPLER: There was no evidence for stenosis. There was trivial 
regurgitation. AORTIC VALVE: Not well visualized. DOPPLER: There was no stenosis. There 
was mild regurgitation. TRICUSPID VALVE: Normal valve structure. There was normal leaflet 
separation. DOPPLER: There was no evidence for tricuspid stenosis. There 
was mild regurgitation. There was mild to moderate pulmonary hypertension. Patient meets for GIP  LOC as evidenced by Patient sitting straight up in bed with hands/arms resting on knees/thighs tripoding      RR 34 with quick shallow breaths. Wet irritating non productive congested cough   Adventitious lung sounds     O2 at 14 l/min via NC. Pale, skin warm and dry, lungs with coarse inspiratory nd expiratory sounds. Olguin for very dark clear kemal urine. Pedal pulses weak. Restless often trying to get up out of bed and pulling at sheets, NC etc.  
  
Order received from [address] [place] [address] to admit patient to inpatient GIP LOC for hospice as there is high likelihood of rapid decline as well as symptoms above in spite of Morphine 5 mg IV x 4 in previous 22 hours and high flow O2 with patient now being placed on 100% NRB mask ASSESSMENT Patient self-reports:  [x]  Yes   Said yes when asked if she was having trouble breathing and no when asked if she was in pain SYMPTOMS: Unit nurses and family reported patient with increased dyspnea/respiratory distress and agitation in last 2 days SIGNS/PHYSICAL FINDINGS: Patient sitting straight up in bed with hands/arms resting on knees/thighs tripoding   Minimally responsive  Pale   Skin warm and dry      RR 34 with quick shallow breaths. Wet irritating non productive congested cough   Adventitious lung sounds     O2 at 14 l/min via NC.   Pale, skin warm and dry, lungs with coarse inspiratory nd expiratory sounds. Olguin for very dark clear kemal urine. Pedal pulses weak. Restless often trying to get up out of bed and pulling at sheets, NC etc.  
  
Order received from [address] [place] [address] to admit patient to inpatient [facility] LOC for hospice as there is high likelihood of rapid decline as well as symptoms above in spite of Morphine 5 mg IV x 4 in previous 22 hours and high flow O2 with patient now being placed on 100% NRB mask KARNOFSKY: 10 
 
 
CLINICAL INFORMATION Wt Readings from Last 3 Encounters:  
01/15/19 46.7 kg (103 lb) 08/08/18 52.6 kg (115 lb 14.4 oz) 08/16/17 54 kg (119 lb) Visit Vitals BP 97/68 (BP 1 Location: Left arm, BP Patient Position: At rest) Pulse (!) 117 Temp 97.7 °F (36.5 °C) Resp 18 SpO2 95% LAB VALUES 
WBC 23    BUN 42    MG 2.9 Lab Results Component Value Date/Time Protein, total 7.6 01/16/2019 02:30 AM  
 Albumin 2.9 (L) 01/16/2019 02:30 AM  
 
 
Currently this patient has: 
[x] Supplemental O2 [x] Peripheral  IV [x] Olguin Catheter PLAN 1. For respiratory distress    Morphine 2 mg IV q 2 hours and 4 mg IV q 1 hr prn  Lorazepam as below as adjunct therapy 2. For pulmonary congestion    Glcopyrrolate 0.2 mg IV q 4 hours and Scopolamine patch both scheduled 3. For restlessness   Lorazepam 0.5 mg IV q 4 hours and 1 mg IV q 1 hr prn Hospice Team Frequency Orders: 
Skilled Nurse - Daily x 7 days  with 5 PRN visits for symptom control. DONAVON  1 visit for initial assessment/evaluation for family support and need for volunteer services. Ирина Jain  1 visit for initial assessment/evaluation for spiritual support. ADVANCE CARE PLANNING (Complete in ACP Flow Sheet) Code Status: DNR Durable DNR:  [x]  No 
Code Status Discussed/Confirmed:YES Preference for Other Life Sustaining Treatment Discussed/Confirmed: YES Hospitalization Preference:   family prefers for patient to remain inpatient Voodoo: Gracie Morse  Home: \"probably  [facility] Notice [facility] in [place]" DISCHARGE PLANNING 1. Discharge Plan: It is expected that patient will pass while inpatient yet home or the [facility] are options 2. Patient/Family teaching:  Education provided on symptoms at end of life 3. Response to patient/family teaching: understanding and appreciation expressed SOCIAL/EMOTIONAL/SPIRITUAL NEEDS Spiritual Issues Identified: Hospice chaplains visit regularly and are available 24 hours a day as needed Psych/ Social/ Emotional Issues Identified: Patient and  Yolis Phipps both have children from previous marriages. Patient's mother and grandmother were at the bedside. Patient's mother had her  pass end of 2018. Many concerns expressed regarding finances. Patient kept it from family how much trouble she was having breathing until she \"could't take it any longer\" and came to the hospital.   Many of family members devistated by this decline.  Yolis Phipps also being treated by the "[employer] and family said \"we always expected him to pass before her. \" 
Emotional and spiritual support will be very important for this  and family. Caregiver Support: 
[x] Provided information on End of Life  [x] Material Provided: Gone From My Sight or Journey's End  
 
CARE COORDINATION Dr. Mirtha Almanza  contacted, discharge to hospice order received Dr. Berlin Fulton contacted who  provided verbal certification of terminal illness with life expectancy of 6 months or less. Orders for hospice admission, medications and plan of treatment received. Medication reconciliation completed. Dr. Lori Cedeno to serve as hospice attending MD 
 
 
MEDS: See medication list below DME: Per hospital 
Supplies: Per hospital 
IDT communication to include MD, SN, SW, CH and support team 
 
ALLERGIES AND MEDICATIONS Allergies: No Known Allergies Current Facility-Administered Medications Medication Dose Route Frequency  morphine injection 2 mg  2 mg IntraVENous Q2H  
 morphine injection 4 mg  4 mg IntraVENous Q1H PRN  
 LORazepam (ATIVAN) injection 0.5 mg  0.5 mg IntraVENous Q4H  
 LORazepam (ATIVAN) injection 1 mg  1 mg IntraVENous Q1H PRN  
 glycopyrrolate (ROBINUL) injection 0.2 mg  0.2 mg IntraVENous Q4H  
 scopolamine (TRANSDERM-SCOP) 1 mg over 3 days 1 Patch  1 Patch TransDERmal Q72H  bisacodyl (DULCOLAX) suppository 10 mg  10 mg Rectal DAILY PRN  
 acetaminophen (TYLENOL) suppository 650 mg  650 mg Rectal Q4H PRN

## 2019-01-19 NOTE — PROGRESS NOTES
0700: Bedside report received from [facility]. 
 
0900: Oral medications held due to pt lethargy. Pollo Chirinos, RRT RN, placed pt on nonrebreather mask.

## 2019-01-19 NOTE — PROGRESS NOTES
Noted progression of illness. Patient transitioning to comfort care. Please let us know if we can assist in any way. Otherwise, we will sign off for now. Thanks for the consult. We appreciate the opportunity to participate in this patient's care.

## 2019-01-19 NOTE — HOSPICE
[facility] Good Help to Those in Need 
[phone] Patient Name: Cayla Pina YOB: 1960 Age: 66 y.o. [facility] RN Note:  Hospice consult noted. Chart reviewed. Plan of care discussed with patients nurse & care manager. In to meet with spouse Matthew Gannon and support of  multiple family members (20+). Discussed Hospice philosophy, general plan of care, levels of care, services and on call procedures. Family information packet provided & reviewed with above. Meeting with Palliative NP Ernestine Reyes. Explained the Plan for hospice when family has had time to gather all the information given to them today. This has been a sudden decline in the last 1-2 mos. (just diagnosed). Spouse Matthew Gannon appreciated the meeting. Liaison explained we would have our weekend team member reach out to them in the a.m. For decision. Case discussed with Dr. Nicole Cobb and in agreement when family is ready go ahead and admit. Assessment of the patient she was received sitting up on side of bed receiving a nebulizer treatment. She was able to answer the questions asked. Eyes are closed but responded to the questions. , rales bilaterally, skin warm and dry, no mottling noted. Very frail appearing lady. NP Eric had started comfort meds and patient seem to become a little more comfortable. Hospice will reassess in the a.m. And if family in agreement will admit. Explained to family if patient rallies there is an opportunity of her going home with hospice as well. Hospice can follow her to [place] where she resides. Family given folder with consents and advised to keep for tomorrow. If they have more questions they can be answered at that time. Case discussed with INDERJIT Reyes and agree if patient can be transferred to first floor oncology for comfort care if floor nurses uncomfortable giving symptom meds. Thank you for the opportunity to be of service to this patient. Luci Jane RN

## 2019-01-19 NOTE — PROGRESS NOTES
Problem: Falls - Risk of 
Goal: *Absence of Falls Document Phoebe President Fall Risk and appropriate interventions in the flowsheet. Outcome: Progressing Towards Goal 
Fall Risk Interventions: 
Mobility Interventions: Patient to call before getting OOB, Strengthening exercises (ROM-active/passive), Utilize walker, cane, or other assistive device, Utilize gait belt for transfers/ambulation Mentation Interventions: Family/sitter at bedside Medication Interventions: Evaluate medications/consider consulting pharmacy Elimination Interventions: Call light in reach Problem: Chronic Obstructive Pulmonary Disease (COPD) Goal: *Oxygen saturation during activity within specified parameters Outcome: Progressing Towards Goal 
Pt sustains 100% oxygenation on high flow oxygen. Pt's breathing presents as an irregular Cheyne's-Almaguer' pattern respirations, usually 12-14 bpms. Problem: Pressure Injury - Risk of 
Goal: *Prevention of pressure injury Document Pop Scale and appropriate interventions in the flowsheet. Outcome: Progressing Towards Goal 
Pressure Injury Interventions: 
Sensory Interventions: Assess changes in LOC Moisture Interventions: Absorbent underpads, Internal/External urinary devices Activity Interventions: Increase time out of bed Mobility Interventions: Turn and reposition approx. every two hours(pillow and wedges) Nutrition Interventions: Discuss nutritional consult with provider Friction and Shear Interventions: Apply protective barrier, creams and emollients, Lift sheet, Lift team/patient mobility team

## 2019-01-19 NOTE — H&P
[facility] Good Help to Those in Need 
[phone] Patient Name: Radha Shields YOB: 1960 Date of Provider Hospice Visit: 01/19/19 Level of Care:   [x] General Inpatient (GIP)    [] Routine   [] Respite Location of Care: 
[] [facility] [] [facility] [x] [facility] [] [facility] [] [facility] Date of Original Hospice Admission: 01/19/19 Hospice Medical Director for Inpatient Care: Dr. Maddie Ramirez Principle Hospice Diagnosis: Respiratory Failure Diagnoses RELATED to the terminal prognosis: end stage COPD, post-obstructive PNA, afib with RVR, NSTEMI, newly diagnosed CHF with EF 25% Other Diagnoses: Metastatic CA to liver,GERD, HTN, tobacco abuse, severe protein calore malnutrition Sam Riveros Do not cut and paste chart information other than imaging findings Radha Shields is a 66y.o. year old who was admitted to [facility]. Patient presented to the ED for sudden onset of shortness of breath with associated nonproductive cough. She reported that she had been coughing for a week, but today she had a coughing fit that lead to shortness of breath. Jan 8th, she had a CT scan done of her chest because of the persistent cough, and was to follow up with pulmonary because of concerning nodules found. Hospital course complicated by elevated troponin (supply/demand mismatch versus NSTEMI), newly diagnosed CHF/ICM, and respiratory failure. Despite all efforts, family continued to decline and family elected to transition to comfort measures and hospice enrollment. 
  
The patient's principle diagnosis has resulted in marked respiratory distress with patient tripoding, being minimally responsive, with RR in 30's and quick, pulmonary congestion/adventitious sounds, wet irritating non productive irritating cough, restlessness and agitation. Refer to LCD Functionally, the patient's Karnofsky and/or Palliative Performance Scale has declined over a period of weeks and is estimated at 10 %. The patient is dependent on the following ADLs: All ADLs Objective information that support this patients limited prognosis includes:  
  
Chest CT (01/08/19) 1. Extensive mediastinal lymphadenopathy with nodular septal thickening in the left lower lobe concerning for lymphangitic spread of tumor, associated with hepatic metastatic disease concerning for small cell carcinoma. 2. Emphysema with probable biapical lung scarring. 3. Narrowing of the left mainstem and segmental bronchi. Nodular thickening of the mainstem bronchi bilaterally Chest xray (01/15/19) Moderate to severe apical emphysematous change with mediastinal and hilar lymphadenopathy CTA Chest (01/15/19) 1. No evidence of pulmonary embolus. 2.  Bulky mediastinal and bilateral hilar lymphadenopathy, narrowing the pulmonary arteries, trachea, and bronchi to both lungs. Findings are not 
significantly changed. 3. Background of severe emphysema. 4. Mild postobstructive airspace disease in the left upper and lower lobes. US abd (01/16/19) Multiple large hepatic masses/metastases Echo (01/16/19) Left ventricle: Systolic function was moderately to markedly reduced. Ejection fraction was estimated in the range of 25 % to 30 %. There was severe hypokinesis of the mid-apical anterior and apical wall(s). The patient/family chose comfort measures with the support of Hospice. HOSPICE DIAGNOSES Active Symptoms: 1. Shortness of breath/respiratory distress 2. Restlessness 3. Agitation PLAN 1. Patient admitted under GIP due to significant respiratory distress despite medical management and significant risk deterioration in next 24 hour. See GIP criteria below. 2. Morphine 2 mg every 15 min as needed for pain/sob. Ativan 1 mg every 15 min as needed for anxiety. 3. Comfort care order set placed 4.  and SW to support family needs 5. Disposition: likely will  at [id number]; if survives, home versus [facility]. This patient meets Hospice General Inpatient (GIP) Level of Care. The precipitating event that resulted in the need for GIP was: progressive respiratory failure The interventions tried that have been unsuccessful at controlling symptoms include: multiple doses of morphine 5 mg and patient still significantly symptomatic Supporting documentation for GIP need for pain control: 
[x] Frequent evaluation by a doctor, nurse practitioner, nurse  
[x] Frequent medication adjustment   
[] IVs that cannot be administered at home  
[] Aggressive pain management  
[] Complicated technical delivery of medications Supporting documentation for GIP need for symptom control: 
[x]  Sudden decline necessitating intensive nursing intervention 
[]  Uncontrolled / intractable nausea or vomiting  
[]  Pathological fractures 
[]  Advanced open wounds requiring frequent skilled care [x] Unmanageable respiratory distress 
[] New or worsening delirium  
[] Delirium with behavior issues [x] Imminent death  with skilled nursing needs documented above Prognosis estimated based on 19 clinical assessment is:   
[x] Hours to Days   
[] Days to Weeks   
[] Other: 
 
Communicated plan of care with: Hospice Case Manager; Hospice IDT; Care Team 
 
 GOALS OF CARE Resuscitation Status: DNR Durable DNR: [] Yes [x] No 
 
Advance Care Planning 2019 Patient's Healthcare Decision Maker is: Legal Next of Kin Confirm Advance Directive None Patient Would Like to Complete Advance Directive Unable HISTORY History obtained from: chart, family, bedside nurse CHIEF COMPLAINT: unresponsive The patient is:  
[] Verbal 
[] Nonverbal 
[x] Unresponsive HPI/SUBJECTIVE:  
 
Patient sitting up in bed, +dyspnea/work of breathing/accessory muscle use. In moderate to severe distress.  
 
 
 REVIEW OF SYSTEMS  
 
 The following systems were: [] reviewed  [x] unable to be reviewed Positive ROS include: 
Constitutional: 
Ears/nose/mouth/throat: 
Respiratory: +SOB Gastrointestinal: poor appetite, sig weight loss Musculoskeletal:  
Neurologic: confusion Psychiatric: 
Endocrine:  
 
Adult Non-Verbal Pain Assessment Score: 9/10 Face 
[] 0   No particular expression or smile 
[] 1   Occasional grimace, tearing, frowning, wrinkled forehead 
[x] 2   Frequent grimace, tearing, frowning, wrinkled forehead Activity (movement) [] 0   Lying quietly, normal position 
[] 1   Seeking attention through movement or slow, cautious movement 
[x] 2   Restless, excessive activity and/or withdrawal reflexes Guarding 
[] 0   Lying quietly, no positioning of hands over areas of body [x] 1   Splinting areas of the body, tense 
[] 2   Rigid, stiff Physiology (vital signs) 
[] 0   Stable vital signs [] 1   Change in any of the following: SBP > 20mm Hg; HR > 20/minute [x] 2   Change in any of the following: SBP > 30mm Hg; HR > 25/minute Respiratory 
[] 0   Baseline RR/SpO2, compliant with ventilator 
[] 1   RR > 10 above baseline, or 5% drop SpO2, mild asynchrony with ventilator 
[x] 2   RR > 20 above baseline, or 10% drop SpO2, asynchrony with ventilator FUNCTIONAL ASSESSMENT Palliative Performance Scale (PPS): 10% PSYCHOSOCIAL/SPIRITUAL ASSESSMENT Active Problems: 
  Respiratory failure ([facility] [address] (1/19/2019) Past Medical History:  
Diagnosis Date  Atrial fibrillation with RVR ([facility] [address] 1/16/2019  Dizziness and giddiness  Elevated troponin 1/16/2019  GERD (gastroesophageal reflux disease)  Tobacco use disorder No past surgical history on file. Social History Tobacco Use  Smoking status: Former Smoker Packs/day: 1.50 Years: 20.00 Pack years: 30.00 Types: Cigarettes  Smokeless tobacco: Never Used  Tobacco comment: 2-3 CIAGARETTES DAILY OR SOMETIMES Substance Use Topics  Alcohol use: No  
  Alcohol/week: 0.0 oz No family history on file. No Known Allergies Current Facility-Administered Medications Medication Dose Route Frequency  morphine injection 2 mg  2 mg IntraVENous Q2H  
 morphine injection 4 mg  4 mg IntraVENous Q1H PRN  
 LORazepam (ATIVAN) injection 0.5 mg  0.5 mg IntraVENous Q4H  
 LORazepam (ATIVAN) injection 1 mg  1 mg IntraVENous Q1H PRN  
 glycopyrrolate (ROBINUL) injection 0.2 mg  0.2 mg IntraVENous Q4H  
 scopolamine (TRANSDERM-SCOP) 1 mg over 3 days 1 Patch  1 Patch TransDERmal Q72H  bisacodyl (DULCOLAX) suppository 10 mg  10 mg Rectal DAILY PRN  
 acetaminophen (TYLENOL) suppository 650 mg  650 mg Rectal Q4H PRN PHYSICAL EXAM  
 
Wt Readings from Last 3 Encounters:  
01/15/19 46.7 kg (103 lb) 08/08/18 52.6 kg (115 lb 14.4 oz) 08/16/17 54 kg (119 lb) There were no vitals taken for this visit. Supplemental O2  [x] Yes  [] NO Last bowel movement:  
 
Currently this patient has: 
[x] Peripheral IV [] PICC  [] PORT [] ICD [x] Olguin Catheter [] NG Tube   [] PEG Tube   
[] Rectal Tube [] Drain 
[] Other:  
 
Constitutional: in significant distress Eyes: eyes closed ENMT: dry mucous membranes Cardiovascular: tachycardic Respiratory: +SOB/WOB Gastrointestinal: +BS, nontender Musculoskeletal: no deformity Skin: warm, dry Neurologic: unresponsive Psychiatric: anxious affect Other: 
 
Pertinent Lab and or Imaging Tests: 
Lab Results Component Value Date/Time  Sodium 136 01/17/2019 12:06 AM  
 Potassium 3.9 01/17/2019 12:06 AM  
 Chloride 104 01/17/2019 12:06 AM  
 CO2 22 01/17/2019 12:06 AM  
 Anion gap 10 01/17/2019 12:06 AM  
 Glucose 135 (H) 01/17/2019 12:06 AM  
 BUN 42 (H) 01/17/2019 12:06 AM  
 Creatinine 0.95 01/17/2019 12:06 AM  
 BUN/Creatinine ratio 44 (H) 01/17/2019 12:06 AM  
 GFR est AA >60 01/17/2019 12:06 AM  
 GFR est non-AA >60 01/17/2019 12:06 AM  
 Calcium 8.7 01/17/2019 12:06 AM  
 
Lab Results Component Value Date/Time  Protein, total 7.6 01/16/2019 02:30 AM  
 Albumin 2.9 (L) 01/16/2019 02:30 AM  
 
   
 
Total time:  
Counseling / coordination time:  
> 50% counseling / coordination?:

## 2019-01-19 NOTE — PROGRESS NOTES
received request from staff to visit with patient's family at bedside.  offered spiritual care support to patient's family. Prayer requested and offered. Plan of care is to follow up as needed with patient's family. ANETA Dunn Div  Paging Service [phone])

## 2019-01-19 NOTE — DISCHARGE SUMMARY
Hospitalist Discharge Summary Patient ID: 
Jp Mckenzie [id number] 
54 y.o. 
1960 PCP on record: Leonard Traore MD 
 
Admit date: 1/15/2019 Discharge date and time: 1/19/2019 DISCHARGE DIAGNOSIS: 
 Acute respiratory failure with hypoxia secondary to end-stage COPD with severe emphysema, metastatic cancer to liver, afib with RVR, possible NSTEMI End-stage COPD/severe emphysema Post-obstructive PNA Metastatic cancer NSTEMI with new diagnosis of systolic heart failure EF 25% 
afib with RVR Hypertension Severe protein-calorie malnutrition in the setting of poor po intake Body mass index is 16.62 kg/m². therefore classifying patient as underweight CONSULTATIONS: 
IP CONSULT TO HOSPITALIST 
IP CONSULT TO CARDIOLOGY 
IP CONSULT TO INTENSIVIST 
IP CONSULT TO HEMATOLOGY 
IP CONSULT TO PALLIATIVE CARE - PROVIDER Excerpted HPI from H&P of Dr Koby Ledesma: 
  
Jevon De Luna is a 66 y.o.  female with known history as listed below presents to ED with complaint noted above. Available records were reviewed at the time of H&P. This is a 28-year-old female with past medical history of GERD, tobacco abuse hypertension who presented to the ED for sudden onset of shortness of breath this morning associated with nonproductive cough.  Patient reported 1 week history of cough but today she felt that her throat was scratchy and had a coughing fit.  Her cough led to shortness of breath,> EMS gave her nebs and put the patient on 6 L of oxygen via nasal cannula which improved her condition. Patient had a CT scan of the chest done in January 8 because of the persistent cough,  CT scan of the chest was concerning for metastatic lung cancer, and she was supposed to follow-up with a pulmonologist for bronchoscopy.  In the ED, vital signs were stable except for sinus tachycardia, labs revealed elevated white blood cell count >16,000 ,elevated troponin at 1.76, elevated proBNP above 700. Cardiology was consulted by ED physician, cardio advised to trend troponin. CTA of the chest was negative for acute PE, but showed severe emphysema, mediastinal and bilateral hilar lymphadenopathy narrowing the pulmonary arteries, trachea, and bronchi to both lungs  . Mild postobstructive airspace disease in the left upper and lower lobes.  
 
 
______________________________________________________________________ DISCHARGE SUMMARY/HOSPITAL COURSE:  for full details see H&P, daily progress notes, labs, consult notes. Acute respiratory failure with hypoxia secondary to end-stage COPD with severe emphysema, metastatic cancer to liver, afib with RVR, possible NSTEMI End-stage COPD/severe emphysema Post-obstructive PNA Metastatic cancer NSTEMI with new diagnosis of systolic heart failure EF 25% 
afib with RVR  Troponin peaked at 6 CT chest revealed bulky mediastinal lymphadenopathy, severe emphysema, mild post-obstructive disease in the Left lung Still tachycardic and hypoxic requiring 6L high flow nasal cannula. Very poor prognosis given new diagnosis of metastatic cancer, end-stage COPD, and systolic heart failure. Discussed case with cardiology team. Plan was for  Liver biopsy  . On heparin drip, duonebs, levaquin, coreg, ASA, statin, mucinex, solumedrol. Oncology team foll. Did not get the biopsy and transitioned to comfort care  
  
Hypertension Low normal blood pressure. Was holding home losartan/HCTZ Monitor.  
  
Anxiety Goals of care Palliative care consulted to help with care decisions. Family transitioned her to comfort care. Pt admitted to inpatient hospice on 1/19 and discharged to inpatient hospice. Severe protein-calorie malnutrition in the setting of poor po intake    
Body mass index is 16.62 kg/m². therefore classifying patient as underweight 
   
  
Code Status:DNR 
DVT Prophylaxis: Hep SQ 
GI Prophylaxis: not indicated _______________________________________________________________________ Patient seen and examined by me on discharge day. Pertinent Findings: 
Gen:    Not in distress Chest: Clear lungs CVS:   Regular rhythm. No edema Abd:  Soft, not distended, not tender 
  
_______________________________________________________________________ DISCHARGE MEDICATIONS:  
Current Discharge Medication List  
  
CONTINUE these medications which have NOT CHANGED Details  
carvedilol (COREG) 3.125 mg tablet take 1 tablet by mouth twice a day 
Qty: 180 Tab, Refills: 3  
  
losartan-hydroCHLOROthiazide (HYZAAR) 100-12.5 mg per tablet take 1 tablet by mouth daily 
Qty: 90 Tab, Refills: 3 Associated Diagnoses: Essential hypertension, benign UBIDECARENONE (COENZYME Q10) 100 mg Tab Take 200 mg by mouth. Associated Diagnoses: Essential hypertension, benign MULTIVIT WITH CALCIUM,IRON,MIN (ONE-A-DAY WOMENS FORMULA PO) Take  by mouth. ibuprofen (ADVIL) 200 mg tablet Take  by mouth every six (6) hours as needed. My Recommended Diet, Activity, Wound Care, and follow-up labs are listed in the patient's Discharge Insturctions which I have personally completed and reviewed. _______________________________________________________________________ DISPOSITION:   
Home with Family:   
Home with HH/PT/OT/RN:   
SNF/LTC:   
SONAL:   
OTHER: x  
 
 
Condition at Discharge:  Admitted to inpatient hospice 
_______________________________________________________________________ Follow up with: PCP : Sushma Pal MD 
Follow-up Information None Total time in minutes spent coordinating this discharge (includes going over instructions, follow-up, prescriptions, and preparing report for sign off to her PCP) : 35 minutes Signed: 
Riri Figueroa MD

## 2019-01-19 NOTE — PROGRESS NOTES
Called to room by patient's sister, patient had passed away. No visible signs of life, no audible breath sounds, no response to stimuli. Patient death verified by 2nd RN Cele ROBLEDO. Calls placed to supervisor,  on call and [facility] services. Call placed to hospice staff.

## 2019-01-20 NOTE — HOSPICE
[facility] Good Help to Those in Need 
[phone] GIP Daily Nursing Note Patient Name: Fara Dudley YOB: 1960 Age: 66 y.o. Date of Visit: 01/20/19 Facility of Care: [facility] Patient Room: [facility]/ Hospice Attending: Gino Ames MD 
Hospice Diagnosis: Respiratory Failure Respiratory failure ([address] Level of Care: GIP Current GIP Symptoms 1. Uncontrolled fever   Acetaminophen suppository given and will give Toradol as ordered if needed 2. Respiratory distress assessed as with RR 28 and quick breaths. Unit nurse Cele will give Morphine 4 mg IV now   Lorazepam 1 mg IV as adjunct therapy   O2 at 4 lpm nc 3. Chest without adventitious sounds   Continue with IV Glycopyrrolate 0.2 mg IV q 4 hours and Scopolamine patch 4.  Restlessness present yesterday assessed to be well palliated with Lorazepam 0.5 mg IV q 4 hours and 1 mg IV q 1 hr prn ASSESSMENT & PLAN Must update Plan of Care including visit frequencies for IDT members 1. See above Spiritual Interventions: Hospice chaplains visit regularly and are available 24 hours a day as needed Psych/ Social/ Emotional Interventions: Large family presence and questions and concerns addressed. Family asked about the Oxygen  \"Was it causing her to live longer. \"   Support given that it is a comfort medication. Patient with son Jonathon Srivastava who is incarcerated. Jonathon Srivastava was able to call today and speak with patient via phone. Care Coordination Needs: discussed with unit nurse Cele    Will send report to [facility] hospice interdisciplinary team. 
 
 
Care plan and New Orders discussed / approved with Alejandro Alvarez MD/ Deniz AG Description History and Chart Review If this is initial GIP note must document RN assessment/MD communication in previous setting. Specifically document nursing/medication needs in last 24 hours to support GIP care Narrative History of last 24 hours that demonstrates care cannot be provided in another setting: 
Patient was admitted to inpatient [facility] level of hospice care 1/19 19. Patient was tripoding with marked respiratory distress in spite of IV Morphine 5 mg x 4 in previous 24 hours. Hospice orders included Morphine 2 mg IV q 2 hours and 4 mg IV q 1 hr prn, Lorazepam 0.5 mg IV q 4 hours and 1 mg IV q 4 hours prn, Glycopyrrolate 0.2 mg IV q 4 hours and Scopolamine patch for wet non producitve irritating cough, O2 for comfort with NC or 100% NRB mask and Acetaminophen suppository 650 mg q 4 hours prn There was high likelihood of sudden decline and patient's death is assessed to be imminent. Medication adjustments made today by KIMBERLI Urbina and patient is requiring IV meds via peripheral IV site What has been done to control the patient's symptoms in the last 24 hours? Abovee Does the patient currently require IV medications? YES Does the patient currently require scheduled medications? YES Does the patient currently require a PCA? NO List number of doses of PRN medications in last 24 hours: 
Medication 1:  MORPHINE 4 MG IV 
Number of doses:  1 Medication 2:  
Number of doses: 
 
Medication 3:  
Number of doses: Supporting documentation for GIP need for pain control: 
[x] Frequent evaluation by a doctor, nurse practitioner, nurse  
[x] Frequent medication adjustment   
[x] IVs that cannot be administered at home  
[x] Aggressive pain management  
[x] Complicated technical delivery of medications Supporting documentation for GIP need for symptom control: 
[x]  Sudden decline necessitating intensive nursing intervention 
[]  Uncontrolled / intractable nausea or vomiting  
[]  Pathological fractures 
[]  Advanced open wounds requiring frequent skilled care [x] Unmanageable respiratory distress 
[] New or worsening delirium [] Delirium with behavior issues: Is 24 hour caregiver present due to safety concerns with agitation? (yes/no) [x] Imminent death  with skilled nursing needs documented above DISCHARGE PLANNING Daily discharge planning required for GIP 1. Discharge Plan: It is expected that patient will pass while inpatient yet home or [facility] are options 2. Patient/Family teaching:  Education provided on symptoms at end of life 3. Response to patient/family teaching: understanding and appreciation expressed ASSESSMENT   
KARNOFSKY: 10 
 
Quality Measure: Patient self-reports:      [x] No 
 
ESAS:  
Time of Assessment: 1130 Pain (1-10):0 Fatigue (1-10): Shortness of breath (1-10):3 Nausea (1-10): Appetite (1-10): Anxiety: (1-10): Depression: (1-10): Well-being: (1-10): Constipation: _ Yes  _ No 
LAST BM:  
 
CLINICAL INFORMATION Patient Vitals for the past 12 hrs: 
 Temp Pulse Resp BP SpO2  
01/20/19 0721 99.7 °F (37.6 °C) (!) 111 20 92/53 97 % Currently this patient has: 
[x] Supplemental O2 [x] IV   
[] PICC [] PORT  
[] NG Tube   
[] PEG Tube  
[] Ostomy    
[x] Olguin draining __very dark amber_____ urine 
[] Other:  
 
SIGNS/PHYSICAL FINDINGS Skin (including wound): 
[] Warm, dry, supple, intact and color normal for race [x] Warm  
[x] Dry  
[] Cool    
[] Clammy      
[] Diaphoretic Turgor 
 [] Normal 
 [] Decreased Color: 
 [] Pink [x] Pale [x] Cyanotic  Lips    Nail beds 
 [] Erythema 
 [] Jaundice [] Normal for Race 
[]  Wounds: 
 
Neuro: 
[] Lethargy 
[] Restlessness / agitation 
[] Confusion / delirium 
[] Hallucinations 
[] Responds to maximal stimulation 
[x] Unresponsive 
[] Seizures Visit Vitals BP 92/53 (BP 1 Location: Left arm, BP Patient Position: At rest) Pulse (!) 111 Temp 99.7 °F (37.6 °C) Resp 20 SpO2 97% Cardiac: 
[] Dyspnea on Exertion 
[] JVD [] Murmur 
[] Palpitations [] Hypotension [] Hypertension 
[] Tachycardia [] Bradycardia 
[] Irregular HR 
[] Pulses Decreased 
[] Pulses Absent 
[] Edema:       (Location, Grade and Pitting) [] Mottling:      (Location) Respiratory: 
Breath sounds:  
 [x] Diminished 
 [] Wheeze 
 [] Rhonchi 
 [] Rales  
[] Even and unlabored 
[x] Labored:    quick       
[] Cough 
 [] Non Productive 
 [] Productive 
  [] Description:          
[] Deep suctioned  
[x] O2 at __4_ LPM 
[] High flow oxygen greater than 10 LPM 
[] Bi-Pap GI [x] Abdomen sunken soft with bowel sounds 
[] Ascites 
[] Nausea 
[] Vomiting 
[] Incontinent of bowels 
[] Bowel sounds (yes/no) [] Diarrhea 
[] Constipation (see above including last bowel movement) [] Checked for impaction 
[] Last BM Nutrition Diet:__________ Appetite:  
[] Good  
[] Fair  
[] Poor  
[] Tube Feeding  
[] Voiding 
[] Incontinent [x] Olguin Musculoskeletal 
[] Balance/[place] Unsteady  
[] Weak Strength:  
 [] Normal  
 [] Limited [x] Decreasing Activities:  
 [] Up as tolerated 
 [x] Bedridden  
 [] Specify: 
 
SAFETY [] 24 hr. Caregiver [x] Side rails ? [x] Hospital bed  
[] Reviewed Falls & Safety ALLERGIES AND MEDICATIONS Allergies: No Known Allergies Current Facility-Administered Medications Medication Dose Route Frequency  LORazepam (ATIVAN) injection 1 mg  1 mg IntraVENous Q15MIN PRN  
 morphine injection 2 mg  2 mg IntraVENous Q15MIN PRN  
 ketorolac (TORADOL) injection 30 mg  30 mg IntraVENous Q8H PRN  
 morphine injection 2 mg  2 mg IntraVENous Q2H  
 LORazepam (ATIVAN) injection 0.5 mg  0.5 mg IntraVENous Q4H  
 glycopyrrolate (ROBINUL) injection 0.2 mg  0.2 mg IntraVENous Q4H  
 scopolamine (TRANSDERM-SCOP) 1 mg over 3 days 1 Patch  1 Patch TransDERmal Q72H  bisacodyl (DULCOLAX) suppository 10 mg  10 mg Rectal DAILY PRN  
 acetaminophen (TYLENOL) suppository 650 mg  650 mg Rectal Q4H PRN Visit Time In: 5632 Visit Time Out: 7693

## 2019-01-20 NOTE — PROGRESS NOTES
Patient  [phone], Hospice Nurse at bedside with family Dr. Ifrah Reyes in to pronounce [facility] called at 1540; Tissue and Eye donation both declined [facility] in [facility] [phone] Post Mortuum care completed Per , Peyton Patel rarrings left in place Placed into transport

## 2019-01-20 NOTE — PROGRESS NOTES
Death Note Called to pronounce patient. No response to noxious stimuli. No spontaneous breath sounds nor cardiac sounds. TOD: 1330. Pronounced 1650. Family present at bedside D/C Summary and death certificate to be completed by Attending MD

## 2019-01-20 NOTE — PROGRESS NOTES
[facility] in Need 
[phone] Patient Name: Lina Peralta YOB: 1960 Date of Provider Hospice Visit: 01/20/19 Level of Care:   [x] General Inpatient (GIP)    [] Routine   [] Respite Location of Care: 
[] [facility] [] [facility] [x] [address] [] [facility] [] [facility]ospice [facility] Date of Original Hospice Admission: 01/19/19 Hospice Medical Director for Inpatient Care: Dr. Rolando Mathur Principle Hospice Diagnosis: Respiratory Failure Diagnoses RELATED to the terminal prognosis: end stage COPD, post-obstructive PNA, afib with RVR, NSTEMI, newly diagnosed CHF with EF 25% Other Diagnoses: Metastatic CA to liver,GERD, HTN, tobacco abuse, severe protein calore malnutrition Keira Henriquez Do not cut and paste chart information other than imaging findings Lina Peralta is a 66y.o. year old who was admitted to [facility]. Patient presented to the ED for sudden onset of shortness of breath with associated nonproductive cough. She reported that she had been coughing for a week, but today she had a coughing fit that lead to shortness of breath. Jan 8th, she had a CT scan done of her chest because of the persistent cough, and was to follow up with pulmonary because of concerning nodules found. Hospital course complicated by elevated troponin (supply/demand mismatch versus NSTEMI), newly diagnosed CHF/ICM, and respiratory failure. Despite all efforts, family continued to decline and family elected to transition to comfort measures and hospice enrollment. 
  
The patient's principle diagnosis has resulted in marked respiratory distress with patient tripoding, being minimally responsive, with RR in 30's and quick, pulmonary congestion/adventitious sounds, wet irritating non productive irritating cough, restlessness and agitation. Refer to LCD Functionally, the patient's Karnofsky and/or Palliative Performance Scale has declined over a period of weeks and is estimated at 10 %. The patient is dependent on the following ADLs: All ADLs Objective information that support this patients limited prognosis includes:  
  
Chest CT (01/08/19) 1. Extensive mediastinal lymphadenopathy with nodular septal thickening in the left lower lobe concerning for lymphangitic spread of tumor, associated with hepatic metastatic disease concerning for small cell carcinoma. 2. Emphysema with probable biapical lung scarring. 3. Narrowing of the left mainstem and segmental bronchi. Nodular thickening of the mainstem bronchi bilaterally Chest xray (01/15/19) Moderate to severe apical emphysematous change with mediastinal and hilar lymphadenopathy CTA Chest (01/15/19) 1. No evidence of pulmonary embolus. 2.  Bulky mediastinal and bilateral hilar lymphadenopathy, narrowing the pulmonary arteries, trachea, and bronchi to both lungs. Findings are not 
significantly changed. 3. Background of severe emphysema. 4. Mild postobstructive airspace disease in the left upper and lower lobes. US abd (01/16/19) Multiple large hepatic masses/metastases Echo (01/16/19) Left ventricle: Systolic function was moderately to markedly reduced. Ejection fraction was estimated in the range of 25 % to 30 %. There was severe hypokinesis of the mid-apical anterior and apical wall(s). The patient/family chose comfort measures with the support of Hospice. HOSPICE DIAGNOSES Active Symptoms: 1. Shortness of breath/respiratory distress 2. Restlessness 3. Agitation PLAN 1. Patient admitted under GIP due to significant respiratory distress despite medical management and significant risk deterioration in next 24 hour. See GIP criteria below. 2. Patient unresponsive with mildly labored breathing. No s/s of pain. Large family present at bedside.   
3. Mophine 2 mg scheduled every two hours and Ativan 0.5 mg scheduled every four hours for shortness of breath and agitation. Three doses of morphine documented as held/missed (?). 4. Morphine 4 mg every 1 hours  as needed for pain/sob. Ativan 1 mg every 1 hour as needed for anxiety. Received one dose of morphine overnight. Given that patient appears to be transitioning, will change to Morphine to 2 mg as needed every 15 min, and ativan 1 mg as needed every 15 min in case patient becomes more symptomatic. \ 5. Scopolamine patch in place and patient on scheduled robinul for secretions. 6. Comfort care order set placed 7.  and SW to support family needs 8. Disposition: Appears to be imminent and will  at [facility]. This patient meets Hospice General Inpatient (GIP) Level of Care. The precipitating event that resulted in the need for GIP was: progressive respiratory failure The interventions tried that have been unsuccessful at controlling symptoms include: multiple doses of morphine 5 mg and patient still significantly symptomatic Supporting documentation for GIP need for pain control: 
[x] Frequent evaluation by a doctor, nurse practitioner, nurse  
[x] Frequent medication adjustment   
[] IVs that cannot be administered at home  
[] Aggressive pain management  
[] Complicated technical delivery of medications Supporting documentation for GIP need for symptom control: 
[x]  Sudden decline necessitating intensive nursing intervention 
[]  Uncontrolled / intractable nausea or vomiting  
[]  Pathological fractures 
[]  Advanced open wounds requiring frequent skilled care [x] Unmanageable respiratory distress 
[] New or worsening delirium  
[] Delirium with behavior issues [x] Imminent death  with skilled nursing needs documented above Prognosis estimated based on 19 clinical assessment is:   
[x] Hours to Days   
[] Days to Weeks   
[] Other: 
 
Communicated plan of care with: Hospice Case Manager;  Hospice IDT; Care Team 
 
 GOALS OF CARE Resuscitation Status: DNR Durable DNR: [] Yes [x] No 
 
Advance Care Planning 1/18/2019 Patient's Healthcare Decision Maker is: Legal Next of Kin Confirm Advance Directive None Patient Would Like to Complete Advance Directive Unable HISTORY History obtained from: chart, family, bedside nurse CHIEF COMPLAINT: unresponsive The patient is:  
[] Verbal 
[] Nonverbal 
[x] Unresponsive HPI/SUBJECTIVE:  
 
Patient unresponsive with mildly labored breathing. No s/s of pain. Large family present at bedside. REVIEW OF SYSTEMS The following systems were: [] reviewed  [x] unable to be reviewed Positive ROS include: 
Constitutional: 
Ears/nose/mouth/throat: 
Respiratory: +SOB Gastrointestinal: poor appetite, sig weight loss Musculoskeletal:  
Neurologic: confusion Psychiatric: 
Endocrine:  
 
Adult Non-Verbal Pain Assessment Score: 0/10 Face [x] 0   No particular expression or smile 
[] 1   Occasional grimace, tearing, frowning, wrinkled forehead 
[] 2   Frequent grimace, tearing, frowning, wrinkled forehead Activity (movement) [x] 0   Lying quietly, normal position 
[] 1   Seeking attention through movement or slow, cautious movement 
[] 2   Restless, excessive activity and/or withdrawal reflexes Guarding 
[x] 0   Lying quietly, no positioning of hands over areas of body 
[] 1   Splinting areas of the body, tense 
[] 2   Rigid, stiff Physiology (vital signs) [x] 0   Stable vital signs [] 1   Change in any of the following: SBP > 20mm Hg; HR > 20/minute 
[] 2   Change in any of the following: SBP > 30mm Hg; HR > 25/minute Respiratory [x] 0   Baseline RR/SpO2, compliant with ventilator 
[] 1   RR > 10 above baseline, or 5% drop SpO2, mild asynchrony with ventilator 
[] 2   RR > 20 above baseline, or 10% drop SpO2, asynchrony with ventilator FUNCTIONAL ASSESSMENT Palliative Performance Scale (PPS): 10% PSYCHOSOCIAL/SPIRITUAL ASSESSMENT Active Problems: 
  Respiratory failure ([facility] [address] (1/19/2019) Past Medical History:  
Diagnosis Date  Atrial fibrillation with RVR ([facility] 1/16/2019  Dizziness and giddiness  Elevated troponin 1/16/2019  GERD (gastroesophageal reflux disease)  Tobacco use disorder No past surgical history on file. Social History Tobacco Use  Smoking status: Former Smoker Packs/day: 1.50 Years: 20.00 Pack years: 30.00 Types: Cigarettes  Smokeless tobacco: Never Used  Tobacco comment: 2-3 CIAGARETTES DAILY OR SOMETIMES Substance Use Topics  Alcohol use: No  
  Alcohol/week: 0.0 oz No family history on file. No Known Allergies Current Facility-Administered Medications Medication Dose Route Frequency  morphine injection 2 mg  2 mg IntraVENous Q2H  
 morphine injection 4 mg  4 mg IntraVENous Q1H PRN  
 LORazepam (ATIVAN) injection 0.5 mg  0.5 mg IntraVENous Q4H  
 LORazepam (ATIVAN) injection 1 mg  1 mg IntraVENous Q1H PRN  
 glycopyrrolate (ROBINUL) injection 0.2 mg  0.2 mg IntraVENous Q4H  
 scopolamine (TRANSDERM-SCOP) 1 mg over 3 days 1 Patch  1 Patch TransDERmal Q72H  bisacodyl (DULCOLAX) suppository 10 mg  10 mg Rectal DAILY PRN  
 acetaminophen (TYLENOL) suppository 650 mg  650 mg Rectal Q4H PRN PHYSICAL EXAM  
 
Wt Readings from Last 3 Encounters:  
01/15/19 46.7 kg (103 lb) 08/08/18 52.6 kg (115 lb 14.4 oz) 08/16/17 54 kg (119 lb) Visit Vitals BP 92/53 (BP 1 Location: Left arm, BP Patient Position: At rest) Pulse (!) 111 Temp 99.7 °F (37.6 °C) Resp 20 SpO2 97% Supplemental O2  [x] Yes  [] NO Last bowel movement:  
 
Currently this patient has: 
[x] Peripheral IV [] PICC  [] PORT [] ICD [x] Olguin Catheter [] NG Tube   [] PEG Tube   
[] Rectal Tube [] Drain 
[] Other:  
 
Constitutional: NAD, unresponsive Eyes: eyes closed ENMT: dry mucous membranes Cardiovascular: tachycardic Respiratory: mild work of breathing Gastrointestinal: +BS, nontender Musculoskeletal: no deformity Skin: warm, dry Neurologic: unresponsive Psychiatric: flat affect Other: 
 
Pertinent Lab and or Imaging Tests: 
Lab Results Component Value Date/Time Sodium 136 01/17/2019 12:06 AM  
 Potassium 3.9 01/17/2019 12:06 AM  
 Chloride 104 01/17/2019 12:06 AM  
 CO2 22 01/17/2019 12:06 AM  
 Anion gap 10 01/17/2019 12:06 AM  
 Glucose 135 (H) 01/17/2019 12:06 AM  
 BUN 42 (H) 01/17/2019 12:06 AM  
 Creatinine 0.95 01/17/2019 12:06 AM  
 BUN/Creatinine ratio 44 (H) 01/17/2019 12:06 AM  
 GFR est AA >60 01/17/2019 12:06 AM  
 GFR est non-AA >60 01/17/2019 12:06 AM  
 Calcium 8.7 01/17/2019 12:06 AM  
 
Lab Results Component Value Date/Time  Protein, total 7.6 01/16/2019 02:30 AM  
 Albumin 2.9 (L) 01/16/2019 02:30 AM  
 
   
 
Total time:  
Counseling / coordination time:  
> 50% counseling / coordination?:

## 2019-01-20 NOTE — PROGRESS NOTES
Responded to call to Oncology when patient . Very large family presence at bedside including mother, , siblings, nieces and nephews. Offered condolences and provided bereavement support and prayer. JAS Maharaj, [address] [facility],  [facility]  Paging Service  [phone])

## 2019-01-20 NOTE — PROGRESS NOTES
1945 - Bedside and Verbal shift change report given to Nestor Horta RN (oncoming nurse) by Kim Schmitz (offgoing nurse). Report included the following information SBAR, Kardex and MAR. Multiple family members at bedside. Pt resting with slightly labored breathing, no grimace. 2049 - Pt has occasional grimace and is mildly restless. Scheduled ativan, robinul, morphine given. 2236 - Pt resting, appears more relaxed at this time. Gasping respirations. Scheduled morphine given. 2330 - Bedside and Verbal shift change report given to [address] (oncoming nurse) by Nestor Horta RN (offgoing nurse). Report included the following information SBAR, Kardex and MAR. Pt's  and daughter in room. No urine output noted during shift. Pt resting quietly with slightly labored breathing, no grimace.

## 2019-01-20 NOTE — HOSPICE
[facility] Help to Those in Need 
[phone] Discharge/Death Nursing Note Patient Name: Franklyn Canchola YOB: 1960 Age: 66 y.o. Date of Death: 19 Admitted Date: 2019 Time of Death: 1330 Facility of Care: [facility] Level of Care: GIP Patient Room: [address] [place] [address] Hospice Attending: Ghazala Grajeda MD 
Hospice Diagnosis: Respiratory Failure Respiratory failure ([address] Death Pronouncement Pronouncement of death completed by: Dr. Tena Rosales Agency staff was  present at the time of death At the time of death the patient was documented as Called to pronounce patient. No response to noxious stimuli. No spontaneous breath sounds nor cardiac sounds. TOD: 1330. Pronounced 1650. Family present at bedside The pt  within [facility] The following were notified of the patient's death: Family at bedside   [facility] hospice interdisciplinary team. 
 
 
Medications were disposed of per facility protocol Discharge Summary Discharge Reason: Death Summary of Care Provided: 
 
[x] Post mortem care provided by staff [x] Notification of  home by staff [x] Goals completed Disciplines involved: [x] RN [x] SW [x]   [x] BC 
 
[x] IDT communication/notification Attending Physician, Dr. Matthew Bean , notified of death Bereaved  Rd Mckenna is identified as high bereavement risk See admission notes  [phone]

## 2019-01-20 NOTE — HOSPICE
[facility] RN follow up visit after hospice admission Patient is unresponsive now with respirations assessed to be easier. No facial grimacing or restlessness. RR 20 with periods of apnea. Support given family and questions / concerns addressed. Family expressed appreciation for the care patient and they are receiving. Please call [phone] if questions or concerns Yolanda Felix RN [facility]

## 2019-01-20 NOTE — PROGRESS NOTES
Representative from [employer] called to release this patient from any donations. She did request that we hold for [employer]. [address] [employer] called to release this patient from any donations.

## 2019-01-22 NOTE — DISCHARGE SUMMARY
Hospice Discharge Summary [address] [place] [address] [facility] in [facility] Date of Admission: 1/19/2019 Date of Discharge: 1/20/2019 Janine Stern is a 66y.o. year old who was admitted to [address] [place] [address] at [address] with a Hospice diagnosis of Respiratory Failure; Respiratory failure ([address] The patient's care was focused on comfort and the patient passed away on 1/20/2019. Add in Hospice Summary through Plan from most recent Progress Note Janine Stern is a 66y.o. year old who was admitted to [address] [place] [address]. Patient presented to the ED for sudden onset of shortness of breath with associated nonproductive cough.  She reported that she had been coughing for a week, but today she had a coughing fit that lead to shortness of breath.  Jan 8th, she had a CT scan done of her chest because of the persistent cough, and was to follow up with pulmonary because of concerning nodules found. Hospital course complicated by elevated troponin (supply/demand mismatch versus NSTEMI), newly diagnosed CHF/ICM, and respiratory failure. Despite all efforts, family continued to decline and family elected to transition to comfort measures and hospice enrollment.

## 2019-04-27 NOTE — PROGRESS NOTES
Hematology Oncology Progress Note Follow up for: Possible Met Ca Chart notes reviewed since last visit. Case discussed with following:  
 
Patient complains of the following: Fatigue, more trouble bleeding Additional concerns noted by the staff:  
 
Patient Vitals for the past 24 hrs: 
 BP Temp Pulse Resp SpO2  
01/18/19 0937 106/70  (!) 116  93 % 01/18/19 0928   (!) 119  91 % 01/18/19 0927     92 % 01/18/19 0926     90 % 01/18/19 0925     (!) 89 % 01/18/19 0923     (!) 88 % 01/18/19 0922     (!) 88 % 01/18/19 0909 108/71  (!) 129 25 91 % 01/18/19 0811 107/87 98 °F (36.7 °C) (!) 132 26 90 % 01/18/19 0810     90 % 01/18/19 0809     (!) 83 % 01/18/19 0807     (!) 82 % 01/18/19 0710   (!) 113    
01/18/19 0427 115/84 97.8 °F (36.6 °C) (!) 132 28 91 % 01/18/19 0009     92 % 01/17/19 2352 108/88 98.3 °F (36.8 °C) (!) 123 24 94 % 01/17/19 1915 106/76 97.9 °F (36.6 °C) (!) 114 22 98 % 01/17/19 1906     95 % 01/17/19 1722 104/77  (!) 115    
01/17/19 1643     98 % 01/17/19 1430 97/61 98.3 °F (36.8 °C) (!) 114 20 99 % 01/17/19 1217     99 % 01/17/19 1205     99 % 01/17/19 1201     99 % 01/17/19 1123 94/68 97.4 °F (36.3 °C) (!) 107 20 100 % 01/17/19 1100 99/77  (!) 114 22 97 % Review of Systems: 
12 point ROS done and negative except as above Physical Examination: 
Gen NAD 
CV reg Lungs few rhonchi 
abd benign Labs: 
Recent Results (from the past 24 hour(s)) PTT Collection Time: 01/17/19  5:18 PM  
Result Value Ref Range aPTT 50.6 (H) 22.1 - 32.0 sec  
 aPTT, therapeutic range     58.0 - 77.0 SECS  
PTT Collection Time: 01/18/19 12:00 AM  
Result Value Ref Range aPTT 47.6 (H) 22.1 - 32.0 sec  
 aPTT, therapeutic range     58.0 - 77.0 SECS  
CBC WITH AUTOMATED DIFF Collection Time: 01/18/19 12:00 AM  
Result Value Ref Range WBC 23.0 (H) 3.6 - 11.0 K/uL Holter Monitor / Event Recorder   Order: [id number]   Status:  Edited Result - FINAL   Visible to patient:  No (Not Released)  Dx:  Palpitations   Notes recorded by Deon Gibson M.D. on 4/26/2019 at 2:27 PM PDT    Same advice he should start a blood thinnner and could see us back to discuss treatment options to prevent episodes    Thank you     Returned patient call and reviewed MD recommendations.  Pt requesting to review recommendations with wife aDrby.  Spoke with Darby and she verbalizes understanding.  Offered [phone] to scheduled FV with MD.  She states no other concerns or questions at this time and is appreciative of information given.   RBC 3.89 3.80 - 5.20 M/uL  
 HGB 11.5 11.5 - 16.0 g/dL HCT 34.8 (L) 35.0 - 47.0 % MCV 89.5 80.0 - 99.0 FL  
 MCH 29.6 26.0 - 34.0 PG  
 MCHC 33.0 30.0 - 36.5 g/dL  
 RDW 13.8 11.5 - 14.5 % PLATELET 381 (H) 440 - 400 K/uL MPV 10.3 8.9 - 12.9 FL  
 NRBC 0.0 0  WBC ABSOLUTE NRBC 0.00 0.00 - 0.01 K/uL NEUTROPHILS 90 (H) 32 - 75 % LYMPHOCYTES 4 (L) 12 - 49 % MONOCYTES 6 5 - 13 % EOSINOPHILS 0 0 - 7 % BASOPHILS 0 0 - 1 % IMMATURE GRANULOCYTES 1 (H) 0.0 - 0.5 % ABS. NEUTROPHILS 20.5 (H) 1.8 - 8.0 K/UL  
 ABS. LYMPHOCYTES 1.0 0.8 - 3.5 K/UL  
 ABS. MONOCYTES 1.3 (H) 0.0 - 1.0 K/UL  
 ABS. EOSINOPHILS 0.0 0.0 - 0.4 K/UL  
 ABS. BASOPHILS 0.0 0.0 - 0.1 K/UL  
 ABS. IMM. GRANS. 0.1 (H) 0.00 - 0.04 K/UL  
 DF AUTOMATED Assessment and Plan:  
Possible Met Ca 
-was not able to have biopsy done yesterday 
-she has widespread disease and his having more trouble breathing 
-She has been made DNR, and the family is moving towards making her comfortable. I think that is very reasonable. Will sign off for now. Please call if we can be of any assistance.

## 2020-05-12 NOTE — PROGRESS NOTES
1. Have you been to the ER, urgent care clinic since your last visit? Hospitalized since your last visit? NO.    2. Have you seen or consulted any other health care providers outside of the [address] [place] [address] since your last visit? Include any pap smears or colon screening. NO.       Chief Complaint   Patient presents with    Annual Exam     PT DENIES ANY CARDIAC SYMPTOMS Wound Care: Bacitracin